# Patient Record
Sex: FEMALE | Employment: OTHER | URBAN - METROPOLITAN AREA
[De-identification: names, ages, dates, MRNs, and addresses within clinical notes are randomized per-mention and may not be internally consistent; named-entity substitution may affect disease eponyms.]

---

## 2017-09-05 LAB
EXT 24 HR UR METANEPHRINE: ABNORMAL
EXT 24 HR UR NORMETANEPHRINE: ABNORMAL
EXT 24 HR UR NORMETANEPHRINE: ABNORMAL
EXT 25 HYDROXY VIT D2: ABNORMAL
EXT 25 HYDROXY VIT D3: ABNORMAL
EXT 5 HIAA 24 HR URINE: ABNORMAL
EXT 5 HIAA BLOOD: ABNORMAL
EXT ACTH: ABNORMAL
EXT AFP: ABNORMAL
EXT ALBUMIN: 4.2 G/DL (ref 3.4–4.8)
EXT ALKALINE PHOSPHATASE: 115 U/L (ref 35–104)
EXT ALT: 13 U/L (ref 4–31)
EXT AMYLASE: ABNORMAL
EXT ANTI ISLET CELL AB: ABNORMAL
EXT ANTI PARIETAL CELL AB: ABNORMAL
EXT ANTI THYROID AB: ABNORMAL
EXT AST: 18 U/L (ref 4–31)
EXT BILIRUBIN DIRECT: ABNORMAL MG/DL
EXT BILIRUBIN TOTAL: 0.4 MG/DL (ref 0.2–1.2)
EXT BK VIRUS DNA QN PCR: ABNORMAL
EXT BUN: 18.3 MG/DL (ref 8–23)
EXT C PEPTIDE: ABNORMAL
EXT CA 125: ABNORMAL
EXT CA 19-9: ABNORMAL
EXT CA 27-29: ABNORMAL
EXT CALCITONIN: ABNORMAL
EXT CALCIUM: 9.8 MG/DL (ref 8.7–10.7)
EXT CEA: ABNORMAL
EXT CHLORIDE: 107 MEQ/L (ref 96–108)
EXT CHOLESTEROL: 171 MG/DL (ref 3–200)
EXT CHROMOGRANIN A: ABNORMAL
EXT CO2: 28 MEQ/L (ref 20–31)
EXT CREATININE UA: ABNORMAL
EXT CREATININE: 1 MG/DL (ref 0.4–1.1)
EXT CYCLOSPORONE LEVEL: ABNORMAL
EXT DOPAMINE: ABNORMAL
EXT EBV DNA BY PCR: ABNORMAL
EXT EPINEPHRINE: ABNORMAL
EXT FOLATE: ABNORMAL
EXT FREE T3: ABNORMAL
EXT FREE T4: ABNORMAL
EXT FSH: ABNORMAL
EXT GASTRIN RELEASING PEPTIDE: ABNORMAL
EXT GASTRIN RELEASING PEPTIDE: ABNORMAL
EXT GASTRIN: ABNORMAL
EXT GGT: ABNORMAL
EXT GHRELIN: ABNORMAL
EXT GLUCAGON: ABNORMAL
EXT GLUCOSE: 109 MG/DL (ref 70–100)
EXT GROWTH HORMONE: ABNORMAL
EXT HCV RNA QUANT PCR: ABNORMAL
EXT HDL: 63 MG/DL
EXT HEMATOCRIT: 38 % (ref 35–60)
EXT HEMOGLOBIN A1C: ABNORMAL
EXT HEMOGLOBIN: 12.1 GM/DL (ref 11–18)
EXT HISTAMINE 24 HR URINE: ABNORMAL
EXT HISTAMINE: ABNORMAL
EXT IGF-1: ABNORMAL
EXT IMMUNKNOW (NON-STIMULATED): ABNORMAL
EXT IMMUNKNOW (STIMULATED): ABNORMAL
EXT INR: ABNORMAL
EXT INSULIN: ABNORMAL
EXT LANREOTIDE LEVEL: ABNORMAL
EXT LDH, TOTAL: ABNORMAL
EXT LDL CHOLESTEROL: 87 MG/DL (ref 0–100)
EXT LIPASE: ABNORMAL
EXT MAGNESIUM: ABNORMAL
EXT METANEPHRINE FREE PLASMA: ABNORMAL
EXT MOTILIN: ABNORMAL
EXT NEUROKININ A CAMB: ABNORMAL
EXT NEUROKININ A ISI: ABNORMAL
EXT NEUROTENSIN: ABNORMAL
EXT NOREPINEPHRINE: ABNORMAL
EXT NORMETANEPHRINE: ABNORMAL
EXT NSE: ABNORMAL
EXT OCTREOTIDE LEVEL: ABNORMAL
EXT PANCREASTATIN CAMB: ABNORMAL
EXT PANCREASTATIN ISI: ABNORMAL
EXT PANCREATIC POLYPEPTIDE: ABNORMAL
EXT PHOSPHORUS: ABNORMAL
EXT PLATELETS: 263 K/UL (ref 150–450)
EXT POTASSIUM: 5.5 MEQ/L (ref 3.5–5.5)
EXT PROGRAF LEVEL: ABNORMAL
EXT PROLACTIN: ABNORMAL
EXT PROTEIN TOTAL: 6.6 G/DL (ref 6–8.4)
EXT PROTEIN UA: ABNORMAL
EXT PT: ABNORMAL
EXT PTH, INTACT: ABNORMAL
EXT PTT: ABNORMAL
EXT RAPAMUNE LEVEL: ABNORMAL
EXT SEROTONIN: ABNORMAL
EXT SODIUM: 144 MEQ/L (ref 133–148)
EXT SOMATOSTATIN: ABNORMAL
EXT SUBSTANCE P: ABNORMAL
EXT TRIGLYCERIDES: 104 MG/DL (ref 0–150)
EXT TRYPTASE: ABNORMAL
EXT TSH: 1270 UIU/ML (ref 0.27–4.2)
EXT URIC ACID: ABNORMAL
EXT URINE AMYLASE U/HR: ABNORMAL
EXT URINE AMYLASE U/L: ABNORMAL
EXT VASOACTIVE INTESTINAL POLYPEPTIDE: ABNORMAL
EXT VITAMIN B12: ABNORMAL
EXT VMA 24 HR URINE: ABNORMAL
EXT WBC: 5.7 K/UL (ref 4.5–10.5)
NEURON SPECIFIC ENOLASE: ABNORMAL

## 2017-09-06 LAB
EXT 24 HR UR METANEPHRINE: ABNORMAL
EXT 24 HR UR NORMETANEPHRINE: ABNORMAL
EXT 24 HR UR NORMETANEPHRINE: ABNORMAL
EXT 25 HYDROXY VIT D2: ABNORMAL
EXT 25 HYDROXY VIT D3: ABNORMAL
EXT 5 HIAA 24 HR URINE: ABNORMAL
EXT 5 HIAA BLOOD: ABNORMAL
EXT ACTH: ABNORMAL
EXT AFP: ABNORMAL
EXT ALBUMIN: ABNORMAL
EXT ALKALINE PHOSPHATASE: ABNORMAL
EXT ALT: ABNORMAL
EXT AMYLASE: ABNORMAL
EXT ANTI ISLET CELL AB: ABNORMAL
EXT ANTI PARIETAL CELL AB: ABNORMAL
EXT ANTI THYROID AB: ABNORMAL
EXT AST: ABNORMAL
EXT BILIRUBIN DIRECT: ABNORMAL MG/DL
EXT BILIRUBIN TOTAL: ABNORMAL
EXT BK VIRUS DNA QN PCR: ABNORMAL
EXT BUN: ABNORMAL
EXT C PEPTIDE: ABNORMAL
EXT CA 125: ABNORMAL
EXT CA 19-9: ABNORMAL
EXT CA 27-29: ABNORMAL
EXT CALCITONIN: ABNORMAL
EXT CALCIUM: ABNORMAL
EXT CEA: ABNORMAL
EXT CHLORIDE: ABNORMAL
EXT CHOLESTEROL: ABNORMAL
EXT CHROMOGRANIN A: 1193 NG/ML (ref 0–93)
EXT CO2: ABNORMAL
EXT CREATININE UA: ABNORMAL
EXT CREATININE: ABNORMAL MG/DL
EXT CYCLOSPORONE LEVEL: ABNORMAL
EXT DOPAMINE: ABNORMAL
EXT EBV DNA BY PCR: ABNORMAL
EXT EPINEPHRINE: ABNORMAL
EXT FOLATE: ABNORMAL
EXT FREE T3: ABNORMAL
EXT FREE T4: ABNORMAL
EXT FSH: ABNORMAL
EXT GASTRIN RELEASING PEPTIDE: ABNORMAL
EXT GASTRIN RELEASING PEPTIDE: ABNORMAL
EXT GASTRIN: ABNORMAL
EXT GGT: ABNORMAL
EXT GHRELIN: ABNORMAL
EXT GLUCAGON: ABNORMAL
EXT GLUCOSE: ABNORMAL
EXT GROWTH HORMONE: ABNORMAL
EXT HCV RNA QUANT PCR: ABNORMAL
EXT HDL: ABNORMAL
EXT HEMATOCRIT: ABNORMAL
EXT HEMOGLOBIN A1C: ABNORMAL
EXT HEMOGLOBIN: ABNORMAL
EXT HISTAMINE 24 HR URINE: ABNORMAL
EXT HISTAMINE: ABNORMAL
EXT IGF-1: ABNORMAL
EXT IMMUNKNOW (NON-STIMULATED): ABNORMAL
EXT IMMUNKNOW (STIMULATED): ABNORMAL
EXT INR: ABNORMAL
EXT INSULIN: ABNORMAL
EXT LANREOTIDE LEVEL: ABNORMAL
EXT LDH, TOTAL: ABNORMAL
EXT LDL CHOLESTEROL: ABNORMAL
EXT LIPASE: ABNORMAL
EXT MAGNESIUM: ABNORMAL
EXT METANEPHRINE FREE PLASMA: ABNORMAL
EXT MOTILIN: ABNORMAL
EXT NEUROKININ A CAMB: ABNORMAL
EXT NEUROKININ A ISI: ABNORMAL
EXT NEUROTENSIN: ABNORMAL
EXT NOREPINEPHRINE: ABNORMAL
EXT NORMETANEPHRINE: ABNORMAL
EXT NSE: ABNORMAL
EXT OCTREOTIDE LEVEL: ABNORMAL
EXT PANCREASTATIN CAMB: ABNORMAL
EXT PANCREASTATIN ISI: ABNORMAL
EXT PANCREATIC POLYPEPTIDE: ABNORMAL
EXT PHOSPHORUS: ABNORMAL
EXT PLATELETS: ABNORMAL
EXT POTASSIUM: ABNORMAL
EXT PROGRAF LEVEL: ABNORMAL
EXT PROLACTIN: ABNORMAL
EXT PROTEIN TOTAL: ABNORMAL
EXT PROTEIN UA: ABNORMAL
EXT PT: ABNORMAL
EXT PTH, INTACT: ABNORMAL
EXT PTT: ABNORMAL
EXT RAPAMUNE LEVEL: ABNORMAL
EXT SEROTONIN: 84 NG/ML (ref 56–244)
EXT SODIUM: ABNORMAL MMOL/L
EXT SOMATOSTATIN: ABNORMAL
EXT SUBSTANCE P: ABNORMAL
EXT TRIGLYCERIDES: ABNORMAL
EXT TRYPTASE: ABNORMAL
EXT TSH: ABNORMAL
EXT URIC ACID: ABNORMAL
EXT URINE AMYLASE U/HR: ABNORMAL
EXT URINE AMYLASE U/L: ABNORMAL
EXT VASOACTIVE INTESTINAL POLYPEPTIDE: ABNORMAL
EXT VITAMIN B12: ABNORMAL
EXT VMA 24 HR URINE: ABNORMAL
EXT WBC: ABNORMAL
NEURON SPECIFIC ENOLASE: ABNORMAL

## 2017-09-08 ENCOUNTER — TELEPHONE (OUTPATIENT)
Dept: NEUROLOGY | Facility: HOSPITAL | Age: 77
End: 2017-09-08

## 2017-09-08 NOTE — TELEPHONE ENCOUNTER
Returned call to the number in the snapshot and the mailbox was full.  Phoned the daughter, Kimmy Wilson also and the VM had not been set up yet.  Unable to leave messages at either number.  Will try again later.

## 2017-09-08 NOTE — TELEPHONE ENCOUNTER
----- Message from Aga Villavicencio sent at 9/6/2017 12:59 PM CDT -----  EAW/NEW - Referred by: Dr. Saldivar from [facility]      Why do you need to be seen?  Stomach Carcinoid w/ Syndrome     Which doctor are you requesting?  Balta

## 2017-09-26 ENCOUNTER — TELEPHONE (OUTPATIENT)
Dept: NEUROLOGY | Facility: HOSPITAL | Age: 77
End: 2017-09-26

## 2017-09-26 NOTE — TELEPHONE ENCOUNTER
----- Message from Kaylyn Evans sent at 9/25/2017  1:15 PM CDT -----  Contact: Patient  Patient called to see if anything had been set up yet for an appointment.  She stated she can't retrieve messages on her phone but she checks the caller ID every day to see if we called.  Per patient, call when ready to schedule or if we need more information and she will call back if she is not at home.  Patient can be reached at [phone].  Thank you  abd

## 2017-09-26 NOTE — TELEPHONE ENCOUNTER
Tried to call patient and her daughter again and unable to leave messages or VM is not set up yet.  Phoned Dr Roldan and spoke to Yanelis informing her of this and she will try to get the patient to call back and speak with me.

## 2017-09-27 ENCOUNTER — TELEPHONE (OUTPATIENT)
Dept: NEUROLOGY | Facility: HOSPITAL | Age: 77
End: 2017-09-27

## 2017-09-27 DIAGNOSIS — C7A.092 PRIMARY MALIGNANT CARCINOID TUMOR OF STOMACH: Primary | ICD-10-CM

## 2017-09-27 RX ORDER — LEVOTHYROXINE SODIUM 50 UG/1
50 TABLET ORAL DAILY
COMMUNITY

## 2017-09-27 RX ORDER — MELOXICAM 15 MG/1
15 TABLET ORAL DAILY
COMMUNITY

## 2017-09-27 RX ORDER — FUROSEMIDE 20 MG/1
20 TABLET ORAL SEE ADMIN INSTRUCTIONS
COMMUNITY

## 2017-09-27 RX ORDER — PANTOPRAZOLE SODIUM 40 MG/1
40 TABLET, DELAYED RELEASE ORAL SEE ADMIN INSTRUCTIONS
COMMUNITY

## 2017-09-27 RX ORDER — CARVEDILOL 3.12 MG/1
3.12 TABLET ORAL DAILY
COMMUNITY

## 2017-09-27 RX ORDER — POTASSIUM CHLORIDE 20 MEQ/1
20 TABLET, EXTENDED RELEASE ORAL SEE ADMIN INSTRUCTIONS
COMMUNITY

## 2017-09-27 RX ORDER — BETAMETHASONE DIPROPIONATE 0.5 MG/G
1 CREAM TOPICAL SEE ADMIN INSTRUCTIONS
COMMUNITY

## 2017-09-27 RX ORDER — TELMISARTAN 80 MG/1
80 TABLET ORAL DAILY
COMMUNITY

## 2017-09-27 NOTE — TELEPHONE ENCOUNTER
Phoned Yanelis again at Dr Fox's office and faxed the entire welcome packet to her office and she will se to it that the patient gets it.  She will also help facilitate getting the labs done correctly.

## 2017-09-27 NOTE — LETTER
September 27, 2017        Chelsea Fox MD  [address]  [place] [address]             [facility]  [place] [address]  Phone: [phone]  Fax: [phone]   Patient: Aga Askew   MR Number: [id number]   YOB: 1940   Date of Visit: 9/27/2017     Dear Dr. Fox,     We contacted   regarding setting up an appointment for an evaluation at our center.  We scheduled a CT scan, octreoscan, blood tumor markers and a pathology re read over the next couple of weeks.  We also scheduled an appointment with Dr. Jason Pedro MD on Wednesday, October 25, 2017 at 9:30 AM for recommendations.  We will forward you a copy of the clinic note after the visit.      Thank you for considering our program and for referring this patient.  If you have any questions, please do not hesitate to contact us.      Sincerely,      Mansi Ma RN            CC  No Recipients    Enclosure

## 2017-09-27 NOTE — TELEPHONE ENCOUNTER
Ordered octreoscan, CT, O scan, tumor markers and path re read per protocol.  Appointment made with MD, info sent to patient. 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

## 2017-09-27 NOTE — TELEPHONE ENCOUNTER
----- Message from Rebecca Romero sent at 9/27/2017  9:50 AM CDT -----  Contact: Patient  EAW/NEW- patient was calling for appointment. Who Called? Patient                                      Referred by:Dr Chelsea Fernández                                     Why do you need to be seen? Diagnosed with carcinoid                                     Which doctor are you requesting? Doesn't matter                                     You may contact patient back to schedule at: [phone]    Instructed patient to gather medical records, Cd's and medication list and fax or mail to us asap.

## 2017-09-27 NOTE — LETTER
September 27, 2017    Aga Askew  [address]  [place] [address]             [facility]  [address]  [address]  [place] [address]  Phone: [phone]  Fax: [phone] Dear Ms. Askew:    Thank you for your interest in our program. It was my pleasure speaking with you today about your upcoming appointment.     You have a scheduled appointment with Dr Jason Pedro MD on Wednesday, October 25, 2017 at 9:30 AM. Our office is located at :    [facility]  Medical Office [facility]  Neuroendocrine Tumor Clinic  [address], [address]  [place], [address]    You are scheduled for a consultation only with the physicians unless otherwise planned. Plan to be here for your visit for 2-3 hrs. If flight arrangements are made, plan to make the return flight after 6 pm if possible. [place] [place] from [facility].    In preparation for your appointment, we ask that you gather the information below and fax them to us ASAP. This will enable us to review all pertinent information at the time of your visit so that recommendations can be made and a plan of care developed for you.     Please return forms along with all paper records via fax asap.    Please fax  1. Insurance cards (front and back)-enlarged if possible  2. Drivers licenses  3. Current medicine list  4. Name, address & phone # of your physician for correspondence  5. Authorization for Release of Information-complete and return to clinic  6. Medical Records-send as soon as you have them together (see guidelines below)    Lab Work: If requested, the special lab markers do require special tubes that have to be ordered by the ordering facility. The lab work should be within 3 months.. The labs take 2-3 weeks to get results so please get labs drawn asap. The name and phone # of the send out lab that does the special labs tests is on the order. You can have the  labs drawn at [employer], [facility], a local hospital, or your doctors office (whichever works). If these lab tests need to be done, I will attach an Outpatient Order form. If you are doing your lab work at a facility other than [facility], call our office to notify us the date you have them drawn and the location and phone number of the lab for easy follow up.    Scans: Please mail copies of CD's of your last scans to the office asap. I would recommend sending them overnight with a tracking number in case of any problems. If you need updated scans, I will attach an Outpatient Order form.    Tissue Biopsy/Pathology: If you had a tissue biopsy or surgery, we will request to have your slides and/or tissue blocks sent to us to perform some special testing on them. Please provide us with a pathology report asa. This testing will be billed to your insurance company.     Operative or Procedure reports: All surgery or procedure reports related to your neuroendocrine tumor should be sent to us.    Insurance Company: You should contact your insurance company to inquire about your insurance coverage and benefits. Ask about co-payments and deductibles when seeing a specialist. Ask if this visit will be in Network or Out of Network. We may be able to work with you if this is out of network for you.    Lodging: Attached is lodging information from the [facility] and a list of local hotels. The [facility] is run by the [employer] and is free of charge. If [facility], you must call my office and talk to [facility]. You will need to complete the application and send it to my office for a physician signature. We will forward it to the [facility] and they will check availability. If you wish to stay at the [facility], apply EARLY, they fill up quickly. You may contact the [facility] a week later to confirm your reservation and ask any questions regarding the facility. You  May only stay at the [facility] 24 hrs prior to  your appointment and up to 24 hrs after your appointment. (THIS CAN ONLY BE USED IF YOU LIVE MORE THAN 40 MILES FROM OUR FACILITY).    Call me if you have any questions, email is not the best way to communicate with our office.    We are looking forward to meeting and taking care of you. If you have any questions or concerns, please don't hesitate to call.     Sincerely,        Marla Nice, RN, BSN  Nurse Manager, Neuroendocrine Tumor Program

## 2017-09-29 ENCOUNTER — TELEPHONE (OUTPATIENT)
Dept: NEUROLOGY | Facility: HOSPITAL | Age: 77
End: 2017-09-29

## 2017-09-29 NOTE — TELEPHONE ENCOUNTER
----- Message from Rebecca Romero sent at 9/29/2017 12:28 PM CDT -----  Contact: Patient  EAW- Patient called wanting to speak to Yue regarding her lab orders. Patient stated that her kidney levels are low. Patient needs clarity on which labs she needs to complete. Call back [phone]

## 2017-09-29 NOTE — TELEPHONE ENCOUNTER
Called pt, JABARI full, cant leave msg.  Called daughter.  Left message for patient to return call on her voice mail.

## 2017-10-02 ENCOUNTER — TELEPHONE (OUTPATIENT)
Dept: NEUROLOGY | Facility: HOSPITAL | Age: 77
End: 2017-10-02

## 2017-10-02 NOTE — TELEPHONE ENCOUNTER
----- Message from Aga Villavicencio sent at 9/28/2017  4:50 PM CDT -----  Regarding: RE: [facility]  I called her and we filled it out and it was submitted  ----- Message -----  From: Mansi Ma RN  Sent: 9/27/2017   2:40 PM  To: Mansi Ma RN, Aga Maye  Subject: Trinh paris                                       Jett Pittman,  Can you please fill out the [employer] application for this patient to be coming in on Sunday, October 22 and leaving on Wednesday, October 25, 2017.  She does not have email and the mail takes so long.  Thanks,  Yue

## 2017-10-03 ENCOUNTER — TELEPHONE (OUTPATIENT)
Dept: NEUROLOGY | Facility: HOSPITAL | Age: 77
End: 2017-10-03

## 2017-10-03 ENCOUNTER — TELEPHONE (OUTPATIENT)
Dept: GASTROENTEROLOGY | Facility: CLINIC | Age: 77
End: 2017-10-03

## 2017-10-03 NOTE — TELEPHONE ENCOUNTER
----- Message from Heather Bean LPN sent at 10/3/2017 11:30 AM CDT -----  Regarding: RE: upper eus  I was able to leave my number with pts daughter, she will give it to her mother today to call me and arrange scheduling    Thank you  Heather  ----- Message -----  From: Mansi Ma RN  Sent: 9/27/2017   2:42 PM  To: Mansi Ma RN, Heather Bean LPN  Subject: upper eus                                        Heather,  Can you please get this patient set up to have an upper eus for stomach carcinoid surveillance with any MD on Monday October 23 and/or October 24 and you will need to work around the scan schedule please.  She is coming from [place], [place].  Please let me know when it is scheduled.  Thanks,  Yue  She is very difficult to get in touch with and might have to call you back, her phone service is terrible!!!

## 2017-10-03 NOTE — TELEPHONE ENCOUNTER
----- Message from Mansi Ma RN sent at 9/27/2017  2:42 PM CDT -----  Regarding: upper eus  Heather,  Can you please get this patient set up to have an upper eus for stomach carcinoid surveillance with any MD on Monday October 23 and/or October 24 and you will need to work around the scan schedule please.  She is coming from [place], [place].  Please let me know when it is scheduled.  Thanks,  Yue  She is very difficult to get in touch with and might have to call you back, her phone service is terrible!!!

## 2017-10-04 ENCOUNTER — TELEPHONE (OUTPATIENT)
Dept: NEUROLOGY | Facility: HOSPITAL | Age: 77
End: 2017-10-04

## 2017-10-04 LAB
EXT 24 HR UR METANEPHRINE: ABNORMAL
EXT 24 HR UR NORMETANEPHRINE: ABNORMAL
EXT 24 HR UR NORMETANEPHRINE: ABNORMAL
EXT 25 HYDROXY VIT D2: ABNORMAL
EXT 25 HYDROXY VIT D3: ABNORMAL
EXT 5 HIAA 24 HR URINE: ABNORMAL
EXT 5 HIAA BLOOD: 8.9 NG/ML (ref 0–22)
EXT ACTH: ABNORMAL
EXT AFP: ABNORMAL
EXT ALBUMIN: 3.7 G/DL (ref 3.5–4.8)
EXT ALKALINE PHOSPHATASE: 112 IU/L (ref 39–117)
EXT ALT: 12 IU/L (ref 0–32)
EXT AMYLASE: ABNORMAL
EXT ANTI ISLET CELL AB: ABNORMAL
EXT ANTI PARIETAL CELL AB: 40.3 (ref 0–20)
EXT ANTI THYROID AB: >600 IU/ML (ref 0–34)
EXT AST: 17 IU/L (ref 0–40)
EXT BILIRUBIN DIRECT: ABNORMAL MG/DL
EXT BILIRUBIN TOTAL: 0.6 MG/DL (ref 0–1.2)
EXT BK VIRUS DNA QN PCR: ABNORMAL
EXT BUN: 16 MG/DL (ref 8–27)
EXT C PEPTIDE: ABNORMAL
EXT CA 125: ABNORMAL
EXT CA 19-9: ABNORMAL
EXT CA 27-29: ABNORMAL
EXT CALCITONIN: ABNORMAL
EXT CALCIUM: 9.1 MG/DL (ref 8.7–10.3)
EXT CEA: ABNORMAL
EXT CHLORIDE: 103 MMOL/L (ref 96–106)
EXT CHOLESTEROL: ABNORMAL
EXT CHROMOGRANIN A: 18 NMOL/L (ref 0–5)
EXT CO2: 21 MMOL/L (ref 18–29)
EXT CREATININE UA: ABNORMAL
EXT CREATININE: 1.04 MG/DL (ref 0.57–1)
EXT CYCLOSPORONE LEVEL: ABNORMAL
EXT DOPAMINE: ABNORMAL
EXT EBV DNA BY PCR: ABNORMAL
EXT EPINEPHRINE: ABNORMAL
EXT FOLATE: 17.1 NG/ML
EXT FREE T3: ABNORMAL
EXT FREE T4: ABNORMAL
EXT FSH: ABNORMAL
EXT GASTRIN RELEASING PEPTIDE: ABNORMAL
EXT GASTRIN RELEASING PEPTIDE: ABNORMAL
EXT GASTRIN: 705 PG/ML (ref 0–115)
EXT GGT: ABNORMAL
EXT GHRELIN: ABNORMAL
EXT GLUCAGON: ABNORMAL
EXT GLUCOSE: 134 MG/DL (ref 65–99)
EXT GROWTH HORMONE: ABNORMAL
EXT HCV RNA QUANT PCR: ABNORMAL
EXT HDL: ABNORMAL
EXT HEMATOCRIT: 34.6 % (ref 34–46.6)
EXT HEMOGLOBIN A1C: ABNORMAL
EXT HEMOGLOBIN: 11.2 G/DL (ref 11.1–15.9)
EXT HISTAMINE 24 HR URINE: ABNORMAL
EXT HISTAMINE: ABNORMAL
EXT IGF-1: ABNORMAL
EXT IMMUNKNOW (NON-STIMULATED): ABNORMAL
EXT IMMUNKNOW (STIMULATED): ABNORMAL
EXT INR: ABNORMAL
EXT INSULIN: ABNORMAL
EXT LANREOTIDE LEVEL: ABNORMAL
EXT LDH, TOTAL: ABNORMAL
EXT LDL CHOLESTEROL: ABNORMAL
EXT LIPASE: ABNORMAL
EXT MAGNESIUM: ABNORMAL
EXT METANEPHRINE FREE PLASMA: ABNORMAL
EXT MOTILIN: ABNORMAL
EXT NEUROKININ A CAMB: ABNORMAL
EXT NEUROKININ A ISI: 12 PG/ML (ref 0–40)
EXT NEUROTENSIN: ABNORMAL
EXT NOREPINEPHRINE: ABNORMAL
EXT NORMETANEPHRINE: ABNORMAL
EXT NSE: ABNORMAL
EXT OCTREOTIDE LEVEL: ABNORMAL
EXT PANCREASTATIN CAMB: ABNORMAL
EXT PANCREASTATIN ISI: 113 PG/ML (ref 10–135)
EXT PANCREATIC POLYPEPTIDE: ABNORMAL
EXT PHOSPHORUS: ABNORMAL
EXT PLATELETS: 232 X10E3/UL (ref 150–379)
EXT POTASSIUM: 4.3 MMOL/L (ref 3.5–5.2)
EXT PROGRAF LEVEL: ABNORMAL
EXT PROLACTIN: ABNORMAL
EXT PROTEIN TOTAL: 6.3 G/DL (ref 6–8.5)
EXT PROTEIN UA: ABNORMAL
EXT PT: ABNORMAL
EXT PTH, INTACT: ABNORMAL
EXT PTT: ABNORMAL
EXT RAPAMUNE LEVEL: ABNORMAL
EXT SEROTONIN: 33 NG/ML (ref 0–420)
EXT SODIUM: 140 MMOL/L (ref 134–144)
EXT SOMATOSTATIN: ABNORMAL
EXT SUBSTANCE P: ABNORMAL
EXT TRIGLYCERIDES: ABNORMAL
EXT TRYPTASE: ABNORMAL
EXT TSH: ABNORMAL
EXT URIC ACID: ABNORMAL
EXT URINE AMYLASE U/HR: ABNORMAL
EXT URINE AMYLASE U/L: ABNORMAL
EXT VASOACTIVE INTESTINAL POLYPEPTIDE: ABNORMAL
EXT VITAMIN B12: 188 PG/ML (ref 211–946)
EXT VMA 24 HR URINE: ABNORMAL
EXT WBC: 3.9 X10E3/UL (ref 3.4–10.8)
NEURON SPECIFIC ENOLASE: ABNORMAL

## 2017-10-04 NOTE — TELEPHONE ENCOUNTER
----- Message from Heather Bean LPN sent at 10/3/2017 11:30 AM CDT -----  Regarding: RE: upper eus  I was able to leave my number with pts daughter, she will give it to her mother today to call me and arrange scheduling    Thank you  Heather  ----- Message -----  From: Mansi Ma RN  Sent: 9/27/2017   2:42 PM  To: Mansi Ma RN, Heather Bean LPN  Subject: upper eus                                        Heather,  Can you please get this patient set up to have an upper eus for stomach carcinoid surveillance with any MD on Monday October 23 and/or October 24 and you will need to work around the scan schedule please.  She is coming from [place], [place].  Please let me know when it is scheduled.  Thanks,  Yue  She is very difficult to get in touch with and might have to call you back, her phone service is terrible!!!

## 2017-10-04 NOTE — TELEPHONE ENCOUNTER
----- Message from Genna Campoverde sent at 10/4/2017 12:36 PM CDT -----  Contact: patient  EAW/NEW- Patient called to inform the office that she did her blood work at [facility] in [place] on [address]. Patient states her phone is not working so will not be able to be contacted at this time.

## 2017-10-10 ENCOUNTER — TELEPHONE (OUTPATIENT)
Dept: GASTROENTEROLOGY | Facility: CLINIC | Age: 77
End: 2017-10-10

## 2017-10-10 DIAGNOSIS — D3A.092 CARCINOID TUMOR OF STOMACH: Primary | ICD-10-CM

## 2017-10-10 NOTE — TELEPHONE ENCOUNTER
----- Message from Kayleigh Mariana sent at 10/3/2017 12:15 PM CDT -----  Contact: self, [phone]  Patient called in returning your call. Please advise.

## 2017-10-10 NOTE — TELEPHONE ENCOUNTER
Spoke with pt, procedure date confirmed and instructions reviewed verbally as well as mailed. Pt verbalized understanding

## 2017-10-11 ENCOUNTER — TELEPHONE (OUTPATIENT)
Dept: NEUROLOGY | Facility: HOSPITAL | Age: 77
End: 2017-10-11

## 2017-10-11 NOTE — TELEPHONE ENCOUNTER
Phoned daughter's number and spoke to her about her mother's appointment.  Informed her that it looks like we have everything that we need for her appt to be comprehensive, tried calling the patient's number and could not leave a message.   The daughter would relay the message that we have all the necessary information for her to have a comprehensive appt.

## 2017-10-11 NOTE — TELEPHONE ENCOUNTER
----- Message from Heather Bean LPN sent at 10/10/2017  3:47 PM CDT -----  Contact: Heather COLON/Jamey Novoa there,    I as able to confirm her procedure for Oct 24th.     She requested to speak with you to be sure she is finalized for all testing    Thank you  Heather

## 2017-10-16 ENCOUNTER — TELEPHONE (OUTPATIENT)
Dept: NEUROLOGY | Facility: HOSPITAL | Age: 77
End: 2017-10-16

## 2017-10-16 NOTE — TELEPHONE ENCOUNTER
Returned call to patient and could not leave a message.  Will try to call daughter. Phoned daughter and started telling her that she did not need to be off the mobic but the phone went dead. Phoned back and finished the message and she will get it to her mother.

## 2017-10-16 NOTE — TELEPHONE ENCOUNTER
----- Message from Rebecca Romero sent at 10/16/2017  1:16 PM CDT -----  Contact: patient  EAW- patient called to see if she needs to be off maloxicam before her scans. Patients call back number [phone]

## 2017-10-23 ENCOUNTER — HOSPITAL ENCOUNTER (OUTPATIENT)
Dept: RADIOLOGY | Facility: HOSPITAL | Age: 77
Discharge: HOME | End: 2017-10-23
Attending: SURGERY
Payer: COMMERCIAL

## 2017-10-23 DIAGNOSIS — C7A.092 PRIMARY MALIGNANT CARCINOID TUMOR OF STOMACH: ICD-10-CM

## 2017-10-23 PROCEDURE — 78803 RP LOCLZJ TUM SPECT 1 AREA: CPT | Mod: TC

## 2017-10-23 PROCEDURE — 74177 CT ABD & PELVIS W/CONTRAST: CPT | Mod: TC

## 2017-10-23 PROCEDURE — 78804 RP LOCLZJ TUM WHBDY 2+D IMG: CPT | Mod: 26,,, | Performed by: RADIOLOGY

## 2017-10-23 PROCEDURE — 25500020 PHARM REV CODE 255: Performed by: SURGERY

## 2017-10-23 PROCEDURE — 78803 RP LOCLZJ TUM SPECT 1 AREA: CPT | Mod: 26,,, | Performed by: RADIOLOGY

## 2017-10-23 PROCEDURE — 74150 CT ABDOMEN W/O CONTRAST: CPT | Mod: 26,,, | Performed by: RADIOLOGY

## 2017-10-23 PROCEDURE — 74177 CT ABD & PELVIS W/CONTRAST: CPT | Mod: 26,,, | Performed by: RADIOLOGY

## 2017-10-23 RX ADMIN — IOHEXOL 100 ML: 350 INJECTION, SOLUTION INTRAVENOUS at 09:10

## 2017-10-23 RX ADMIN — IOHEXOL 30 ML: 350 INJECTION, SOLUTION INTRAVENOUS at 07:10

## 2017-10-24 ENCOUNTER — HOSPITAL ENCOUNTER (OUTPATIENT)
Dept: RADIOLOGY | Facility: HOSPITAL | Age: 77
Discharge: HOME | End: 2017-10-24
Attending: SURGERY
Payer: COMMERCIAL

## 2017-10-24 ENCOUNTER — SURGERY (OUTPATIENT)
Age: 77
End: 2017-10-24

## 2017-10-24 ENCOUNTER — HOSPITAL ENCOUNTER (OUTPATIENT)
Dept: RADIOLOGY | Facility: HOSPITAL | Age: 77
Discharge: HOME | End: 2017-10-24
Attending: SURGERY | Admitting: INTERNAL MEDICINE
Payer: COMMERCIAL

## 2017-10-24 ENCOUNTER — ANESTHESIA (OUTPATIENT)
Dept: ENDOSCOPY | Facility: HOSPITAL | Age: 77
End: 2017-10-24
Payer: COMMERCIAL

## 2017-10-24 ENCOUNTER — ANESTHESIA EVENT (OUTPATIENT)
Dept: ENDOSCOPY | Facility: HOSPITAL | Age: 77
End: 2017-10-24
Payer: COMMERCIAL

## 2017-10-24 ENCOUNTER — HOSPITAL ENCOUNTER (OUTPATIENT)
Facility: HOSPITAL | Age: 77
Discharge: HOME | End: 2017-10-24
Attending: INTERNAL MEDICINE | Admitting: INTERNAL MEDICINE
Payer: COMMERCIAL

## 2017-10-24 VITALS
SYSTOLIC BLOOD PRESSURE: 174 MMHG | BODY MASS INDEX: 35.49 KG/M2 | HEART RATE: 59 BPM | OXYGEN SATURATION: 98 % | RESPIRATION RATE: 16 BRPM | DIASTOLIC BLOOD PRESSURE: 66 MMHG | TEMPERATURE: 98 F | WEIGHT: 213 LBS | HEIGHT: 65 IN

## 2017-10-24 DIAGNOSIS — D3A.092 CARCINOID TUMOR OF STOMACH: Primary | ICD-10-CM

## 2017-10-24 DIAGNOSIS — C7A.092 PRIMARY MALIGNANT CARCINOID TUMOR OF STOMACH: ICD-10-CM

## 2017-10-24 PROCEDURE — 63600175 PHARM REV CODE 636 W HCPCS: Performed by: NURSE ANESTHETIST, CERTIFIED REGISTERED

## 2017-10-24 PROCEDURE — 78803 RP LOCLZJ TUM SPECT 1 AREA: CPT | Mod: 26,,, | Performed by: RADIOLOGY

## 2017-10-24 PROCEDURE — 43239 EGD BIOPSY SINGLE/MULTIPLE: CPT | Performed by: INTERNAL MEDICINE

## 2017-10-24 PROCEDURE — 43259 EGD US EXAM DUODENUM/JEJUNUM: CPT | Mod: ,,, | Performed by: INTERNAL MEDICINE

## 2017-10-24 PROCEDURE — 88360 TUMOR IMMUNOHISTOCHEM/MANUAL: CPT | Performed by: PATHOLOGY

## 2017-10-24 PROCEDURE — 88305 TISSUE EXAM BY PATHOLOGIST: CPT | Performed by: PATHOLOGY

## 2017-10-24 PROCEDURE — 43239 EGD BIOPSY SINGLE/MULTIPLE: CPT | Mod: 59,,, | Performed by: INTERNAL MEDICINE

## 2017-10-24 PROCEDURE — 43231 ESOPHAGOSCOP ULTRASOUND EXAM: CPT | Performed by: INTERNAL MEDICINE

## 2017-10-24 PROCEDURE — 37000008 HC ANESTHESIA 1ST 15 MINUTES: Performed by: INTERNAL MEDICINE

## 2017-10-24 PROCEDURE — 27201012 HC FORCEPS, HOT/COLD, DISP: Performed by: INTERNAL MEDICINE

## 2017-10-24 PROCEDURE — 63600175 PHARM REV CODE 636 W HCPCS: Performed by: INTERNAL MEDICINE

## 2017-10-24 PROCEDURE — 74150 CT ABDOMEN W/O CONTRAST: CPT | Mod: TC

## 2017-10-24 PROCEDURE — A9572 INDIUM IN-111 PENTETREOTIDE: HCPCS

## 2017-10-24 PROCEDURE — 78803 RP LOCLZJ TUM SPECT 1 AREA: CPT | Mod: TC

## 2017-10-24 PROCEDURE — 37000009 HC ANESTHESIA EA ADD 15 MINS: Performed by: INTERNAL MEDICINE

## 2017-10-24 PROCEDURE — 25000003 PHARM REV CODE 250: Performed by: INTERNAL MEDICINE

## 2017-10-24 PROCEDURE — 27200997: Performed by: INTERNAL MEDICINE

## 2017-10-24 PROCEDURE — 88305 TISSUE EXAM BY PATHOLOGIST: CPT | Mod: 26,,, | Performed by: PATHOLOGY

## 2017-10-24 PROCEDURE — 88360 TUMOR IMMUNOHISTOCHEM/MANUAL: CPT | Mod: 26,,, | Performed by: PATHOLOGY

## 2017-10-24 PROCEDURE — 88342 IMHCHEM/IMCYTCHM 1ST ANTB: CPT | Mod: 26,59,, | Performed by: PATHOLOGY

## 2017-10-24 RX ORDER — SODIUM CHLORIDE 9 MG/ML
INJECTION, SOLUTION INTRAVENOUS CONTINUOUS
Status: DISCONTINUED | OUTPATIENT
Start: 2017-10-24 | End: 2017-10-24 | Stop reason: HOSPADM

## 2017-10-24 RX ORDER — ONDANSETRON HCL IN 0.9 % NACL 8 MG/50 ML
8 INTRAVENOUS SOLUTION, PIGGYBACK (ML) INTRAVENOUS ONCE AS NEEDED
Status: DISCONTINUED | OUTPATIENT
Start: 2017-10-24 | End: 2017-10-24

## 2017-10-24 RX ORDER — FENTANYL CITRATE 50 UG/ML
INJECTION, SOLUTION INTRAMUSCULAR; INTRAVENOUS
Status: DISCONTINUED | OUTPATIENT
Start: 2017-10-24 | End: 2017-10-24

## 2017-10-24 RX ORDER — ONDANSETRON 2 MG/ML
8 INJECTION INTRAMUSCULAR; INTRAVENOUS ONCE AS NEEDED
Status: COMPLETED | OUTPATIENT
Start: 2017-10-24 | End: 2017-10-24

## 2017-10-24 RX ORDER — PROPOFOL 10 MG/ML
VIAL (ML) INTRAVENOUS CONTINUOUS PRN
Status: DISCONTINUED | OUTPATIENT
Start: 2017-10-24 | End: 2017-10-24

## 2017-10-24 RX ORDER — PROPOFOL 10 MG/ML
VIAL (ML) INTRAVENOUS
Status: DISCONTINUED | OUTPATIENT
Start: 2017-10-24 | End: 2017-10-24

## 2017-10-24 RX ORDER — LIDOCAINE HCL/PF 100 MG/5ML
SYRINGE (ML) INTRAVENOUS
Status: DISCONTINUED | OUTPATIENT
Start: 2017-10-24 | End: 2017-10-24

## 2017-10-24 RX ADMIN — ONDANSETRON 8 MG: 2 INJECTION, SOLUTION INTRAMUSCULAR; INTRAVENOUS at 10:10

## 2017-10-24 RX ADMIN — FENTANYL CITRATE 50 MCG: 50 INJECTION, SOLUTION INTRAMUSCULAR; INTRAVENOUS at 11:10

## 2017-10-24 RX ADMIN — PROPOFOL 150 MCG/KG/MIN: 10 INJECTION, EMULSION INTRAVENOUS at 11:10

## 2017-10-24 RX ADMIN — OCTREOTIDE ACETATE: 500 INJECTION, SOLUTION INTRAVENOUS; SUBCUTANEOUS at 10:10

## 2017-10-24 RX ADMIN — PROPOFOL 50 MG: 10 INJECTION, EMULSION INTRAVENOUS at 11:10

## 2017-10-24 RX ADMIN — LIDOCAINE HYDROCHLORIDE 75 MG: 20 INJECTION, SOLUTION INTRAVENOUS at 11:10

## 2017-10-24 RX ADMIN — SODIUM CHLORIDE: 0.9 INJECTION, SOLUTION INTRAVENOUS at 10:10

## 2017-10-24 NOTE — DISCHARGE INSTRUCTIONS
Post Upper EUS Instructions:     Aga Askew  10/24/2017  Edward Mendoza MD    1. Do Not eat or drink anything for 1 hour. Try sips of water first. If tolerated, resume your regular diet or one recommended by your physician.  2. Do not drive, or operate machinery, make critical decisions, or do activities that require coordination or balance for 24 hours.  3. You may experience a sore throat for 24 to 48 hours. You may use throat lozenges or gargle with warm salt water to relieve the discomfort.  4. Because air was put into your stomach during the procedure, you may experience some belching.   Go directly to the emergency room if you notice any of the following:                              Chills and/or fever over 101                Persistent vomiting or vomiting with blood                Severe abdominal pain, other than gas cramps                Severe chest pain                Black, tarry stools    If you have any questions or problems, please call your Physician:    Edward Mendoza MD     Lab Results: [phone]    If a complication or emergency situation arises and you are unable to reach your Physician - GO TO THE EMERGENCY ROOM.

## 2017-10-24 NOTE — DISCHARGE SUMMARY
Discharge Summary/Instructions for after an Upper EUS    Aga Askew    Tuesday, October 24, 2017  Edward Mendoza MD    1.  Do Not eat or drink anything for 1 hour.  Try sips of water first.  If tolerated, resume your regular diet or one recommended by your physician.  2.  Do not drive, operate machinery, make critical decisions, or do activities that require coordination or balance for 24 hours.  3.  You may experience a sore throat for 24 to 48 hours.  You may use throat lozenges or gargle with warm salt water to relieve the discomfort.  4.  Because air was put into your stomach during the procedure, you may experience some belching.  5.  Go directly to the emergency room if you notice any of the following:     Chills and/or fever over 101   Persistent vomiting or vomiting with blood   Severe abdominal pain, other than gas cramps   Severe chest pain   Black, tarry stools    Your doctor recommends these additional instructions:    None    You are being discharged to home.   We are waiting for pathology results.   Your physician has recommended a repeat upper endoscopic ultrasound in six months for surveillance based on pathology results.    If you have any questions on the above instructions, call the GI Lab and ask for an endoscopy nurse.    If you have any problems, please call your physician.  EMERGENCY PHONE NUMBER: [phone]  LAB RESULTS: [phone]

## 2017-10-24 NOTE — TRANSFER OF CARE
"Anesthesia Transfer of Care Note    Patient: Aga Askew    Procedure(s) Performed: Procedure(s) (LRB):  ULTRASOUND-ENDOSCOPIC-UPPER (N/A)    Patient location: GI    Anesthesia Type: MAC    Transport from OR: Transported from OR on room air with adequate spontaneous ventilation    Post pain: adequate analgesia    Post assessment: no apparent anesthetic complications    Post vital signs: stable    Level of consciousness: awake    Nausea/Vomiting: no nausea/vomiting    Complications: none    Transfer of care protocol was followed      Last vitals:   Visit Vitals  BP (!) 115/57 (BP Location: Left arm, Patient Position: Lying)   Pulse 70   Temp 36.5 °C (97.7 °F) (Oral)   Resp 16   Ht 5' 5" (1.651 m)   Wt 96.6 kg (213 lb)   SpO2 98%   Breastfeeding? No   BMI 35.45 kg/m²     "

## 2017-10-24 NOTE — PLAN OF CARE
Past Medical History:   Diagnosis Date    HTN (hypertension)     Hypothyroidism     Malignant carcinoid tumor of stomach    Cataract    Accompanied by daughter, ok for MD to speak to her re: results of exam.

## 2017-10-24 NOTE — ANESTHESIA PREPROCEDURE EVALUATION
10/24/2017  Aga Askew is a 77 y.o., female with Carciniod tumor of the stomach.     Anesthesia Evaluation      I have reviewed the Medications.     Review of Systems  Anesthesia Hx:  No problems with previous Anesthesia Denies Hx of Anesthetic complications History of prior surgery of interest to airway management or planning: Previous anesthesia: General Denies Family Hx of Anesthesia complications.   Denies Personal Hx of Anesthesia complications.   Social:  Non-Smoker, No Alcohol Use    Hematology/Oncology:  Hematology Normal   Oncology Normal     EENT/Dental:EENT/Dental Normal   Cardiovascular:   Exercise tolerance: good Hypertension    Pulmonary:  Pulmonary Normal    Renal/:  Renal/ Normal     Hepatic/GI:   Hiatal Hernia,  Hepatic/GI Symptoms: nausea.  Esophageal / Stomach Disorders Controlled by s/p surgical Fundoplication.    Musculoskeletal:  Musculoskeletal Normal    Neurological:  Neurology Normal    Endocrine:   Hypothyroidism    Dermatological:  Skin Normal    Psych:  Psychiatric Normal           Physical Exam  General:  Well nourished    Airway/Jaw/Neck:  Airway Findings: Mouth Opening: Normal Tongue: Normal  General Airway Assessment: Adult  Mallampati: II      Dental:  Dental Findings: In tact    Chest/Lungs:  Chest/Lungs Findings: Clear to auscultation, Normal Respiratory Rate     Heart/Vascular:  Heart Findings: Rate: Normal  Rhythm: Regular Rhythm        Mental Status:  Mental Status Findings:  Cooperative, Alert and Oriented         Anesthesia Plan  Type of Anesthesia, risks & benefits discussed:  Anesthesia Type:  MAC, general  Patient's Preference: MAC  Intra-op Monitoring Plan:   Intra-op Monitoring Plan Comments:   Post Op Pain Control Plan:   Post Op Pain Control Plan Comments:   Induction:   IV  Beta Blocker:         Informed Consent: Patient understands risks and agrees with  Anesthesia plan.  Questions answered. Anesthesia consent signed with patient.  ASA Score: 2     Day of Surgery Review of History & Physical:            Ready For Surgery From Anesthesia Perspective.

## 2017-10-24 NOTE — ANESTHESIA POSTPROCEDURE EVALUATION
"Anesthesia Post Evaluation    Patient: Aga Askew    Procedure(s) Performed: Procedure(s) (LRB):  ULTRASOUND-ENDOSCOPIC-UPPER (N/A)    Final Anesthesia Type: MAC  Patient location during evaluation: GI PACU  Patient participation: Yes- Able to Participate  Level of consciousness: awake and alert  Post-procedure vital signs: reviewed and stable  Pain management: adequate  Airway patency: patent  PONV status at discharge: No PONV  Anesthetic complications: no      Cardiovascular status: blood pressure returned to baseline and hemodynamically stable  Respiratory status: unassisted, spontaneous ventilation and room air  Hydration status: euvolemic  Follow-up not needed.        Visit Vitals  BP (!) 115/57 (BP Location: Left arm, Patient Position: Lying)   Pulse 70   Temp 36.5 °C (97.7 °F) (Oral)   Resp 16   Ht 5' 5" (1.651 m)   Wt 96.6 kg (213 lb)   SpO2 98%   Breastfeeding? No   BMI 35.45 kg/m²       Pain/Eloy Score: No Data Recorded      "

## 2017-10-24 NOTE — H&P
History & Physical - Short Stay  Gastroenterology      SUBJECTIVE:     Procedure: EUS    Chief Complaint/Indication for Procedure: Gastric carcinoid    History of Present Illness:  Patient is a 77 y.o. female presents with gastric fundus carcinoid here for evaluation. Stated intermittent episode of diarrhea, denied rash.    PTA Medications   Medication Sig    betamethasone dipropionate (DIPROLENE) 0.05 % cream Apply 1 each topically As instructed.    carvedilol (COREG) 3.125 MG tablet Take 3.125 mg by mouth once daily.    furosemide (LASIX) 20 MG tablet Take 20 mg by mouth As instructed.    levothyroxine (SYNTHROID) 50 MCG tablet Take 50 mcg by mouth once daily.    meloxicam (MOBIC) 15 MG tablet Take 15 mg by mouth once daily.    potassium chloride SA (K-DUR,KLOR-CON) 20 MEQ tablet Take 20 mEq by mouth As instructed.    telmisartan (MICARDIS) 80 MG Tab Take 80 mg by mouth once daily.    pantoprazole (PROTONIX) 40 MG tablet Take 40 mg by mouth As instructed.       Review of patient's allergies indicates:   Allergen Reactions    Codeine Shortness Of Breath        Past Medical History:   Diagnosis Date    HTN (hypertension)     Hypothyroidism     Malignant carcinoid tumor of stomach      Past Surgical History:   Procedure Laterality Date    APPENDECTOMY      CHOLECYSTECTOMY      COLON SURGERY      fistula repair    COLONOSCOPY      ESOPHAGEAL DILATION      2-3 times    FOOT SURGERY Right     fused    HIATAL HERNIA REPAIR      HYSTERECTOMY  02/2008     History reviewed. No pertinent family history.  Social History   Substance Use Topics    Smoking status: Never Smoker    Smokeless tobacco: Never Used    Alcohol use No       Review of Systems:  Constitutional: no fever or chills  Respiratory: no cough or shortness of breath  Cardiovascular: no chest pain or palpitations  Gastrointestinal: positive for diarrhea    OBJECTIVE:     Vital Signs (Most Recent)  Temp: 97.7 °F (36.5 °C) (10/24/17  0957)  Pulse: 61 (10/24/17 0957)  Resp: 20 (10/24/17 0957)  BP: (!) 198/89 (10/24/17 1002)  SpO2: 100 % (10/24/17 0957)    Physical Exam:  General: well developed, well nourished  Lungs:  clear to auscultation bilaterally and normal respiratory effort  Heart: regular rate, S1, S2 normal  Abdomen: soft, non-tender non-distented; bowel sounds normal; no masses,  no organomegaly    Laboratory  CBC: No results for input(s): WBC, RBC, HGB, HCT, PLT, MCV, MCH, MCHC in the last 168 hours.  CMP: No results for input(s): GLU, CALCIUM, ALBUMIN, PROT, NA, K, CO2, CL, BUN, CREATININE, ALKPHOS, ALT, AST, BILITOT in the last 168 hours.  Coagulation: No results for input(s): LABPROT, INR, APTT in the last 168 hours.      Diagnostic Results:       ASSESSMENT/PLAN:     Gastric carcinoid    Plan: EUS    Anesthesia Plan: MAC    ASA Grade: ASA 3 - Patient with moderate systemic disease with functional limitations       The impression and plan was discussed in detail with the patient and family. All questions have been answered and the patient voices understanding of our plan at this point. The risk of the procedure was discussed in detail which includes but not limited to bleeding, infection, perforation in some cases requiring surgery with its spectrum of complications.

## 2017-10-25 ENCOUNTER — LAB VISIT (OUTPATIENT)
Dept: LAB | Facility: HOSPITAL | Age: 77
End: 2017-10-25
Attending: SURGERY
Payer: COMMERCIAL

## 2017-10-25 ENCOUNTER — TELEPHONE (OUTPATIENT)
Dept: GASTROENTEROLOGY | Facility: CLINIC | Age: 77
End: 2017-10-25

## 2017-10-25 ENCOUNTER — OFFICE VISIT (OUTPATIENT)
Dept: NEUROLOGY | Facility: HOSPITAL | Age: 77
End: 2017-10-25
Attending: SURGERY
Payer: COMMERCIAL

## 2017-10-25 VITALS
SYSTOLIC BLOOD PRESSURE: 179 MMHG | HEIGHT: 64 IN | TEMPERATURE: 97 F | WEIGHT: 214.06 LBS | BODY MASS INDEX: 36.55 KG/M2 | DIASTOLIC BLOOD PRESSURE: 82 MMHG | HEART RATE: 68 BPM

## 2017-10-25 DIAGNOSIS — D3A.092 CARCINOID TUMOR OF STOMACH: Primary | ICD-10-CM

## 2017-10-25 DIAGNOSIS — C7A.092 PRIMARY MALIGNANT CARCINOID TUMOR OF STOMACH: ICD-10-CM

## 2017-10-25 PROCEDURE — 88323 CONSLTJ&REPRT MATRL PREP SLD: CPT

## 2017-10-25 PROCEDURE — 88360 TUMOR IMMUNOHISTOCHEM/MANUAL: CPT | Mod: 26,59,,

## 2017-10-25 PROCEDURE — 88399 UNLISTED SURGICAL PATH PX: CPT

## 2017-10-25 PROCEDURE — 88323 CONSLTJ&REPRT MATRL PREP SLD: CPT | Mod: 26,,,

## 2017-10-25 PROCEDURE — 88360 TUMOR IMMUNOHISTOCHEM/MANUAL: CPT

## 2017-10-25 PROCEDURE — 99215 OFFICE O/P EST HI 40 MIN: CPT | Mod: 25 | Performed by: SURGERY

## 2017-10-25 RX ORDER — ACETAMINOPHEN 500 MG
500 TABLET ORAL EVERY 6 HOURS PRN
COMMUNITY

## 2017-10-25 RX ORDER — LANOLIN ALCOHOL/MO/W.PET/CERES
CREAM (GRAM) TOPICAL
COMMUNITY
Start: 2016-02-16

## 2017-10-25 NOTE — LETTER
October 25, 2017        NATASHA Brown MD  [address],   [place] [address]       NOLANETS: [facility]  A collaboration between [facility] and [facility] [address]  [address]  [place] [address]  Phone: [phone]  Fax: [phone]        BIRD Gifford M.D., FACS  Antoni Simpson M.D.  Hiram Andres M.D.   Kerwin Orlando M.D., FACS  DO Jason Grimaldo M.D., FACS   Patient: Aga Askew   MR Number: [id number]   YOB: 1940   Date of Visit: 10/25/2017     Dear Dr. Brown    Thank you for the kind referral of Aga Askew. We saw this patient on 10/25/2017, and a copy of our clinic note is enclosed. We certainly appreciate the opportunity to participate in your patient's care.     If you have any questions or wish to discuss your patient further, please do not hesitate to contact us at [phone].       Kindest personal regards,          Jason Pedro MD, FACS  [facility]  [facility], Dept. Of Surgery  [address], [address]  [place] [address] [phone]

## 2017-10-25 NOTE — TELEPHONE ENCOUNTER
----- Message from Mansi Ma RN sent at 10/25/2017 12:59 PM CDT -----  Regarding: upper eus in april 2018  Jett Romero,  Can you please reach out to this patient and get her set up fro another EUS on April 24, 2018.  She has a CT on April 23 and an appt with Dr Pedro on April 25, 2018.    Thanks,  Yue

## 2017-10-25 NOTE — PATIENT INSTRUCTIONS
Labs every 3 months-orders given to patient    CT prior to returning to clinic-call [phone] to schedule    Return to clinic in 6 months-appointment scheduled    Someone will contact you to set up the EUS when you are here to see Dr Pedro in April 2018

## 2017-10-25 NOTE — PROGRESS NOTES
"NOLANETS:  [place] [facility] [facility]  A collaboration between [facility] and [facility]    PATIENT: Aga Askew  MRN: [id number]  DATE: 10/25/2017    Vitals:    10/25/17 0927   BP: (!) 179/82   Pulse: 68   Temp: 97.1 °F (36.2 °C)   TempSrc: Oral   Weight: 97.1 kg (214 lb 1.1 oz)   Height: 5' 4" (1.626 m)      Last 2 Weight Measurements:   Wt Readings from Last 2 Encounters:   10/25/17 97.1 kg (214 lb 1.1 oz)   10/24/17 96.6 kg (213 lb)     BMI: Body mass index is 36.74 kg/m².    Karnofsky Score    Karnofsky Score:  80% - Normal Activity with Effort: Some Symptoms of Disease       Diagnosis:   1. Carcinoid tumor of stomach      Interval History: Ms. 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    Past Medical History:   Diagnosis Date    HTN (hypertension)     Hypothyroidism     Malignant carcinoid tumor of stomach        Past Surgical History:   Procedure Laterality Date    APPENDECTOMY      CHOLECYSTECTOMY      COLON SURGERY      fistula repair    COLONOSCOPY      ESOPHAGEAL DILATION      2-3 times    FOOT SURGERY Right     fused    HIATAL HERNIA REPAIR      HYSTERECTOMY  02/2008       Review of patient's allergies indicates:   Allergen Reactions    Codeine Shortness Of Breath and Other (See Comments)     Chest pain    Dexamethasone      Itching       Current Outpatient Prescriptions   Medication Sig Dispense Refill    acetaminophen (TYLENOL) 500 MG tablet Take 500 mg by mouth every 6 (six) hours as needed for Pain.      betamethasone dipropionate (DIPROLENE) 0.05 % cream Apply 1 each topically As instructed.      carvedilol (COREG) 3.125 MG tablet Take 3.125 mg by mouth once daily.      furosemide (LASIX) 20 MG tablet Take 20 mg by mouth As instructed.      levothyroxine (SYNTHROID) 50 MCG tablet Take 50 mcg by mouth once daily.      magnesium oxide (MAG-OX) 400 mg tablet " TAKE 1 TABLET BY MOUTH EVERY DAY      meloxicam (MOBIC) 15 MG tablet Take 15 mg by mouth once daily.      pantoprazole (PROTONIX) 40 MG tablet Take 40 mg by mouth As instructed.      potassium chloride SA (K-DUR,KLOR-CON) 20 MEQ tablet Take 20 mEq by mouth As instructed.      telmisartan (MICARDIS) 80 MG Tab Take 80 mg by mouth once daily.       No current facility-administered medications for this visit.        Review of Systems     Number of Days per Week Number per Day   DIARRHEA occasioanl    BRISTOL STOOL SCALE RATING     FLUSHING occasional    WHEEZING 0      WEIGHT GAIN/LOSS Stable--213 today   ENERGY RATING (0-10) 10      Physical Exam deferred.    Findings:       Endoscopic Finding :       The examined esophagus was normal.       Multiple small sessile polyps with no bleeding and no stigmata of        recent bleeding were found in the gastric body. Biopsies were taken        with a cold forceps for histology.       Diffuse mildly erythematous mucosa without bleeding was found in the        entire examined stomach. Biopsies were taken with a cold forceps for        histology. pH 6.       The examined duodenum was normal.       Endosonographic Finding :       The esophagus, stomach and duodenum and adjacent structures were        visualized endosonographically.       The Aorta was identified and is followed to the Celiac and        Mesenteric Artery takeoff. They appeared normal without evidence of        lymphadenopathy in the region. The Celiac Artery is then followed        until the pancreas is identified. The pancreatic body and tail are        surveyed from this region.       The pancreatic parenchyma has the normal salt and pepper appearance.        The pancreatic duct measured 1.4 mm in diameter. The pancreatic duct        was normal in its course without areas of dilation, stricture or        irregularity. No abnormal side branches were identified.       Few mucosal lesions were found in the body  of the stomach. The        lesion were homogeneous. Sonographically, the lesions appeared to        originate from the luminal interface/superficial mucosa (Layer 1).        The lesions also measured 5 mm by 4 mm in diameter. The outer        endosonographic borders were well defined.       There was no sign of significant endosonographic abnormality in the        visualized portion of the liver. Homogeneous parenchyma was        identified.       No lymphadenopathy seen.       There was oozing of blood from the biopsy siet in the antrum.        Coagulation for hemostasis using argon plasma at 1 liter/minute and        25 henry was successful. For hemostasis, two hemostatic clips were        successfully placed. There was no bleeding at the end of the        procedure.  Impression:           - Normal esophagus.                        - Multiple gastric polyps. Biopsied.                        - Erythematous mucosa in the stomach. Biopsied.                        - Normal examined duodenum.                        - An intramural (subepithelial) lesion was found in                         the body of the stomach. The lesion appeared to                         originate from within the luminal                         interface/superficial mucosa (Layer 1).                        - There was no evidence of significant pathology in                         the visualized portion of the liver.  Pathology Data:  pending    Laboratory Data:  Neuroendocrine Labs Latest Ref Rng & Units 10/4/2017 9/6/2017   EXT 5 HIAA BLOOD 0 - 22 ng/ml 8.9    EXT GASTRIN 0 - 115 pg/ml 705 (A)    EXT SEROTONIN 0 - 420 ng/ml 33 84   EXT CHROMOGRANIN A 0 - 5 nmol/l 18 (A) 1,193 (A)   EXT PANCREASTATIN CHINYERE 10 - 135 pg/ml 113    EXT NEUROKININ A CHINYERE 0 - 40.0 pg/ml 12.0    EXT ANTI PARIETAL CELL AB 0.0 - 20.0 40.3 (A)    EXT ANTI THYROID AB 0 - 34 iu/ml >600    EXT FOLATE >3.0 ng/ml 17.1    EXT VITAMIN B12 211 - 946 pg/ml 188 (A)    EXT TSH 0.270 -  4.200 uiu/ml     EXT WBC 3.4 - 10.8 x10e3/ul 3.9    EXT HGB 11.1 - 15.9 g/dl 11.2    EXT HCT 34.0 - 46.6 % 34.6    EXT PLATLETS 150 - 379 x10e3/ul 232    EXT GLUCOSE 65 - 99 mg/dl 134 (A)    EXT BUN 8 - 27 mg/dl 16    EXT CREATININE 0.57 - 1.00 mg/dl 1.04 (A)    EXT  - 144 mmol/l 140    EXT K 3.5 - 5.2 mmol/l 4.3    EXT CHLORIDE 96 - 106 mmol/l 103    EXT CO2 18 - 29 mmol/l 21    EXT CALCIUM 8.7 - 10.3 mg/dl 9.1    EXT PROTEIN, TOTAL 6.0 - 8.5 g/dl 6.3    EXT ALBUMIN 3.5 - 4.8 g/dl 3.7    EXT TOTAL BILIRUBIN 0.0 - 1.2 mg/dl 0.6    EXT ALK PHOSPHATASE 39 - 117 iu/l 112    EXT SGOT (AST) 0 - 40 iu/l 17    EXT ALT 0 - 32 iu/l 12    EXT TRIGLYCERIDES 0 - 150 mg/dl     EXT CHOLESETEROL 3 - 200 mg/dl     EXT HDL >40 mg/dl     EXT LDL 0 - 100 mg/dl     Weight        Neuroendocrine Labs Latest Ref Rng & Units 9/5/2017   EXT 5 HIAA BLOOD 0 - 22 ng/ml    EXT GASTRIN 0 - 115 pg/ml    EXT SEROTONIN 0 - 420 ng/ml    EXT CHROMOGRANIN A 0 - 5 nmol/l    EXT PANCREASTATIN CHINYERE 10 - 135 pg/ml    EXT NEUROKININ A CHINYERE 0 - 40.0 pg/ml    EXT ANTI PARIETAL CELL AB 0.0 - 20.0    EXT ANTI THYROID AB 0 - 34 iu/ml    EXT FOLATE >3.0 ng/ml    EXT VITAMIN B12 211 - 946 pg/ml    EXT TSH 0.270 - 4.200 uiu/ml 1,270 (A)   EXT WBC 3.4 - 10.8 x10e3/ul 5.7   EXT HGB 11.1 - 15.9 g/dl 12.1   EXT HCT 34.0 - 46.6 % 38   EXT PLATLETS 150 - 379 x10e3/ul 263   EXT GLUCOSE 65 - 99 mg/dl 109 (A)   EXT BUN 8 - 27 mg/dl 18.3   EXT CREATININE 0.57 - 1.00 mg/dl 1.00   EXT  - 144 mmol/l 144   EXT K 3.5 - 5.2 mmol/l 5.5   EXT CHLORIDE 96 - 106 mmol/l 107   EXT CO2 18 - 29 mmol/l 28   EXT CALCIUM 8.7 - 10.3 mg/dl 9.8   EXT PROTEIN, TOTAL 6.0 - 8.5 g/dl 6.6   EXT ALBUMIN 3.5 - 4.8 g/dl 4.2   EXT TOTAL BILIRUBIN 0.0 - 1.2 mg/dl 0.4   EXT ALK PHOSPHATASE 39 - 117 iu/l 115 (A)   EXT SGOT (AST) 0 - 40 iu/l 18   EXT ALT 0 - 32 iu/l 13   EXT TRIGLYCERIDES 0 - 150 mg/dl 104   EXT CHOLESETEROL 3 - 200 mg/dl 171   EXT HDL >40 mg/dl 63   EXT LDL 0 - 100 mg/dl 87    Weight         Radiology Data:  Findings:  Visualized lower chest is unremarkable.    The segment 4A left hepatic lobe demonstrates a tiny (0.5 cm) low density lesion, too small to characterize. No suspicious liver lesion.    Cholecystectomy. The spleen, pancreas, and adrenals are unremarkable. Main portal vein is patent.    The residual colon demonstrates scattered diverticula. No bowel wall thickening or dilation. Unremarkable distal colonic anastomosis. Stomach is unremarkable. Unremarkable post surgical changes around the proximal stomach. No ascites.    No adenopathy.    Focal right renal scarring. Kidneys are otherwise unremarkable. Ureters are unremarkable. Hysterectomy. Urinary bladder is unremarkable.    Body wall soft tissues are unremarkable.    No suspicious bone lesion.   Impression       1. No evidence of abdominal or pelvic metastasis.    2. No acute abnormality.                       Impression:  1.  gastric type 1 NET-- pathology from EUS this week is pending       Plan:restage in 6 month with eus and ct scan--alternate eus and egd every 6 months                       start b12 1000 micrograms every month IM       Labs: Markers: 3 month intervals            Other: 6 month intervals--alternate eus ( NEXT VISIT) WITH EGD EVERY 6 MONTHS    Scans: 6 month intervals--CT    Return to Clinic:6 month intervals    Orders placed this visit: No orders of the defined types were placed in this encounter.       60 new  Minutes Face-to-Face; Counseling/Coordination of Care > 50 percent of Visit     Jason Pedro MD, FACS  [facility] [place] [facility]  [address] [place] [address]., [address]  [place]  [address]  Cell [phone]  ph. [phone]; [phone]  fax. [phone]  [email or web address]

## 2017-11-02 ENCOUNTER — TELEPHONE (OUTPATIENT)
Dept: NEUROLOGY | Facility: HOSPITAL | Age: 77
End: 2017-11-02

## 2017-11-02 NOTE — TELEPHONE ENCOUNTER
----- Message from Jackelin Hay sent at 11/2/2017  4:24 PM CDT -----  Contact: [phone] self  Patient would like to get test results. Please advise.

## 2018-02-20 ENCOUNTER — TELEPHONE (OUTPATIENT)
Dept: GASTROENTEROLOGY | Facility: CLINIC | Age: 78
End: 2018-02-20

## 2018-02-20 DIAGNOSIS — D3A.092 CARCINOID TUMOR OF STOMACH: Primary | ICD-10-CM

## 2018-02-22 ENCOUNTER — TELEPHONE (OUTPATIENT)
Dept: NEUROLOGY | Facility: HOSPITAL | Age: 78
End: 2018-02-22

## 2018-02-22 NOTE — TELEPHONE ENCOUNTER
----- Message from Lakshmi Vann LPN sent at 2/21/2018  4:17 PM CST -----  Patient scheduled for EUS on 04/24 per recall list.  Patient would like to discuss a different date for Dr. Pedro's appointment.  Please call patient tomorrow after 300 to discuss.  Please let me know if the procedure needs to be rescheduled.  Thanks

## 2018-04-11 ENCOUNTER — TELEPHONE (OUTPATIENT)
Dept: NEUROLOGY | Facility: HOSPITAL | Age: 78
End: 2018-04-11

## 2018-04-11 ENCOUNTER — TELEPHONE (OUTPATIENT)
Dept: GASTROENTEROLOGY | Facility: CLINIC | Age: 78
End: 2018-04-11

## 2018-04-11 NOTE — TELEPHONE ENCOUNTER
Made sure that all appts with Dr. Pedro for April have been canceled and routed a message to Lakshmi Caba to have the procedure scheduled on 4/24/18 with Dr. Collins to be canceled.

## 2018-04-11 NOTE — TELEPHONE ENCOUNTER
----- Message from Rebecca Romero sent at 4/11/2018 11:26 AM CDT -----  Contact: Patient  EAW/NEW- Patient called to cancel all tests and appt with  Dr Pedro. Patients procedure needs to be cancelled. She will call back to reschedule after she gets everything straight with insurance

## 2018-04-11 NOTE — TELEPHONE ENCOUNTER
----- Message from Martha Torres sent at 4/11/2018 11:46 AM CDT -----  Contact: self  Patient states need to cancel procedure scheduled for 4/24/2018.   Pt will call to reschedule.

## 2018-04-26 ENCOUNTER — TELEPHONE (OUTPATIENT)
Dept: GASTROENTEROLOGY | Facility: CLINIC | Age: 78
End: 2018-04-26

## 2018-04-26 DIAGNOSIS — D3A.00 CARCINOID TUMOR: Primary | ICD-10-CM

## 2021-05-18 ENCOUNTER — APPOINTMENT (RX ONLY)
Dept: URBAN - METROPOLITAN AREA CLINIC 158 | Facility: CLINIC | Age: 81
Setting detail: DERMATOLOGY
End: 2021-05-18

## 2021-05-18 VITALS — TEMPERATURE: 97.4 F | HEIGHT: 65 IN | WEIGHT: 223 LBS

## 2021-05-18 DIAGNOSIS — L29.89 OTHER PRURITUS: ICD-10-CM

## 2021-05-18 DIAGNOSIS — R21 RASH AND OTHER NONSPECIFIC SKIN ERUPTION: ICD-10-CM | Status: INADEQUATELY CONTROLLED

## 2021-05-18 DIAGNOSIS — L30.9 DERMATITIS, UNSPECIFIED: ICD-10-CM

## 2021-05-18 PROBLEM — L29.8 OTHER PRURITUS: Status: ACTIVE | Noted: 2021-05-18

## 2021-05-18 PROCEDURE — ? TREATMENT REGIMEN

## 2021-05-18 PROCEDURE — ? BIOPSY BY SHAVE METHOD

## 2021-05-18 PROCEDURE — ? PRESCRIPTION

## 2021-05-18 PROCEDURE — 99204 OFFICE O/P NEW MOD 45 MIN: CPT | Mod: 25

## 2021-05-18 PROCEDURE — 36415 COLL VENOUS BLD VENIPUNCTURE: CPT

## 2021-05-18 PROCEDURE — ? COUNSELING

## 2021-05-18 PROCEDURE — ? VENIPUNCTURE

## 2021-05-18 PROCEDURE — ? ORDER TESTS

## 2021-05-18 PROCEDURE — 11102 TANGNTL BX SKIN SINGLE LES: CPT

## 2021-05-18 RX ORDER — TRIAMCINOLONE ACETONIDE 1 MG/G
APPLY CREAM TOPICAL BID
Qty: 45 | Refills: 3 | Status: ERX

## 2021-05-18 RX ORDER — FEXOFENADINE 180 MG/1
180MG TABLET, FILM COATED ORAL BID
Qty: 60 | Refills: 5 | Status: ERX

## 2021-05-18 RX ORDER — CETIRIZINE HCL 10 MG
10MG CAPSULE ORAL BID
Qty: 60 | Refills: 3 | Status: ERX | COMMUNITY
Start: 2021-05-18

## 2021-05-18 RX ADMIN — Medication 1: at 00:00

## 2021-05-18 ASSESSMENT — LOCATION ZONE DERM
LOCATION ZONE: TRUNK
LOCATION ZONE: LEG
LOCATION ZONE: ARM

## 2021-05-18 ASSESSMENT — LOCATION DETAILED DESCRIPTION DERM
LOCATION DETAILED: RIGHT ANTERIOR PROXIMAL THIGH
LOCATION DETAILED: PERIUMBILICAL SKIN
LOCATION DETAILED: LEFT DISTAL MEDIAL POSTERIOR UPPER ARM
LOCATION DETAILED: LEFT ANTERIOR PROXIMAL THIGH
LOCATION DETAILED: LEFT ANTECUBITAL SKIN
LOCATION DETAILED: LEFT RIB CAGE
LOCATION DETAILED: LEFT ANTERIOR DISTAL THIGH
LOCATION DETAILED: RIGHT ANTERIOR DISTAL THIGH

## 2021-05-18 ASSESSMENT — LOCATION SIMPLE DESCRIPTION DERM
LOCATION SIMPLE: LEFT UPPER ARM
LOCATION SIMPLE: RIGHT THIGH
LOCATION SIMPLE: LEFT POSTERIOR UPPER ARM
LOCATION SIMPLE: LEFT THIGH
LOCATION SIMPLE: ABDOMEN

## 2021-05-18 ASSESSMENT — BSA RASH: BSA RASH: 20

## 2021-05-18 NOTE — PROCEDURE: BIOPSY BY SHAVE METHOD
Bill For Surgical Tray: no
Cryotherapy Text: The wound bed was treated with cryotherapy after the biopsy was performed.
Information: Selecting Yes will display possible errors in your note based on the variables you have selected. This validation is only offered as a suggestion for you. PLEASE NOTE THAT THE VALIDATION TEXT WILL BE REMOVED WHEN YOU FINALIZE YOUR NOTE. IF YOU WANT TO FAX A PRELIMINARY NOTE YOU WILL NEED TO TOGGLE THIS TO 'NO' IF YOU DO NOT WANT IT IN YOUR FAXED NOTE.
Anesthesia Volume In Cc: 0.5
Notification Instructions: Patient will be notified of biopsy results. However, patient instructed to call the office if not contacted within 2 weeks.
Depth Of Biopsy: dermis
Consent: Verbal consent was obtained and risks were reviewed including but not limited to scarring, infection, bleeding, scabbing, incomplete removal, nerve damage and allergy to anesthesia.
Dressing: bandage
Post-Care Instructions: I reviewed with the patient in detail post-care instructions. Patient is to keep the biopsy site dry overnight, and then apply bacitracin twice daily until healed. Patient may apply hydrogen peroxide soaks to remove any crusting.
Curettage Text: The wound bed was treated with curettage after the biopsy was performed.
Additional Anesthesia Volume In Cc (Will Not Render If 0): 0
Was A Bandage Applied: Yes
Hemostasis: Emir's
Biopsy Type: H and E
Silver Nitrate Text: The wound bed was treated with silver nitrate after the biopsy was performed.
Billing Type: Third-Party Bill
Detail Level: Detailed
Biopsy Method: Dermablade
Wound Care: Vaseline
Anesthesia Type: 1% lidocaine without epinephrine
Electrodesiccation And Curettage Text: The wound bed was treated with electrodesiccation and curettage after the biopsy was performed.
Type Of Destruction Used: Curettage
Electrodesiccation Text: The wound bed was treated with electrodesiccation after the biopsy was performed.

## 2021-05-18 NOTE — PROCEDURE: TREATMENT REGIMEN
Detail Level: Zone
Initiate Treatment: Fexofinadine 180mg BID\\nTriamcinolone cream
Continue Regimen: Cetirizine 10mg BID
Initiate Treatment: TAC 0.1% cream BID

## 2021-05-18 NOTE — PROCEDURE: VENIPUNCTURE
Bill 55452 For Specimen Handling/Conveyance To Laboratory?: no
Detail Level: None
Number Of Tubes Drawn: 5
Venipuncture Paragraph: An alcohol pad was applied to the venipuncture site. Venipuncture was performed using a butterfly needle. Pressure and a bandaid was applied to the site. No complications were noted.

## 2021-06-29 ENCOUNTER — APPOINTMENT (RX ONLY)
Dept: URBAN - METROPOLITAN AREA CLINIC 158 | Facility: CLINIC | Age: 81
Setting detail: DERMATOLOGY
End: 2021-06-29

## 2021-06-29 VITALS — HEIGHT: 64 IN | WEIGHT: 224 LBS

## 2021-06-29 DIAGNOSIS — L30.9 DERMATITIS, UNSPECIFIED: ICD-10-CM

## 2021-06-29 DIAGNOSIS — L29.89 OTHER PRURITUS: ICD-10-CM | Status: IMPROVED

## 2021-06-29 PROBLEM — L29.8 OTHER PRURITUS: Status: ACTIVE | Noted: 2021-06-29

## 2021-06-29 PROCEDURE — ? TREATMENT REGIMEN

## 2021-06-29 PROCEDURE — 99213 OFFICE O/P EST LOW 20 MIN: CPT

## 2021-06-29 PROCEDURE — ? COUNSELING

## 2021-06-29 ASSESSMENT — LOCATION SIMPLE DESCRIPTION DERM
LOCATION SIMPLE: RIGHT UPPER BACK
LOCATION SIMPLE: RIGHT UPPER ARM
LOCATION SIMPLE: LEFT POSTERIOR UPPER ARM
LOCATION SIMPLE: LEFT THIGH
LOCATION SIMPLE: RIGHT THIGH

## 2021-06-29 ASSESSMENT — LOCATION DETAILED DESCRIPTION DERM
LOCATION DETAILED: LEFT DISTAL MEDIAL POSTERIOR UPPER ARM
LOCATION DETAILED: RIGHT INFERIOR UPPER BACK
LOCATION DETAILED: RIGHT ANTERIOR DISTAL THIGH
LOCATION DETAILED: RIGHT ANTERIOR DISTAL UPPER ARM
LOCATION DETAILED: LEFT ANTERIOR DISTAL THIGH

## 2021-06-29 ASSESSMENT — LOCATION ZONE DERM
LOCATION ZONE: LEG
LOCATION ZONE: TRUNK
LOCATION ZONE: ARM

## 2021-06-29 NOTE — PROCEDURE: TREATMENT REGIMEN
Discontinue Regimen: Cetirizine 10mg BID (pt is not taking currently)
Detail Level: Zone
Continue Regimen: Fexofinadine 180mg BID, Take one tablet PO BID
Continue Regimen: TAC 0.1% cream BID prn
Plan: No rash today. Pt will have TAC cream available if needed
Plan: Pt has improved with Fexofenadine. She denies any itching at this time. She can wean Fexofenadine if she would like but will restart if itching returns

## 2021-10-12 ENCOUNTER — APPOINTMENT (RX ONLY)
Dept: URBAN - METROPOLITAN AREA CLINIC 158 | Facility: CLINIC | Age: 81
Setting detail: DERMATOLOGY
End: 2021-10-12

## 2021-10-12 DIAGNOSIS — L29.89 OTHER PRURITUS: ICD-10-CM | Status: UNCHANGED

## 2021-10-12 PROBLEM — L29.8 OTHER PRURITUS: Status: ACTIVE | Noted: 2021-10-12

## 2021-10-12 PROCEDURE — ? PRESCRIPTION

## 2021-10-12 PROCEDURE — ? TREATMENT REGIMEN

## 2021-10-12 PROCEDURE — 99214 OFFICE O/P EST MOD 30 MIN: CPT

## 2021-10-12 PROCEDURE — ? COUNSELING

## 2021-10-12 RX ORDER — LEVOCETIRIZINE DIHYDROCHLORIDE 5 MG
5 TABLET ORAL BID
Qty: 60 | Refills: 5 | Status: ERX

## 2021-10-12 ASSESSMENT — LOCATION DETAILED DESCRIPTION DERM
LOCATION DETAILED: PERIUMBILICAL SKIN
LOCATION DETAILED: RIGHT ANTERIOR PROXIMAL UPPER ARM
LOCATION DETAILED: RIGHT PROXIMAL PRETIBIAL REGION
LOCATION DETAILED: LEFT PROXIMAL PRETIBIAL REGION
LOCATION DETAILED: SUPERIOR LUMBAR SPINE
LOCATION DETAILED: LEFT ANTERIOR PROXIMAL UPPER ARM

## 2021-10-12 ASSESSMENT — LOCATION SIMPLE DESCRIPTION DERM
LOCATION SIMPLE: ABDOMEN
LOCATION SIMPLE: LEFT PRETIBIAL REGION
LOCATION SIMPLE: LEFT UPPER ARM
LOCATION SIMPLE: RIGHT UPPER ARM
LOCATION SIMPLE: RIGHT PRETIBIAL REGION
LOCATION SIMPLE: LOWER BACK

## 2021-10-12 ASSESSMENT — LOCATION ZONE DERM
LOCATION ZONE: ARM
LOCATION ZONE: LEG
LOCATION ZONE: TRUNK

## 2021-10-12 NOTE — PROCEDURE: TREATMENT REGIMEN
Detail Level: Zone
Initiate Treatment: Xyzal 5 mg BID
Plan: Pt does not have rash today. She is very itchy and is causing some excoriation. She is taking Fexofenadine BID. Cetirizine made her too drowsy. Will change to Xyzal BID. Discussed dapsone if pt does not improve
Continue Regimen: Fexofenadine 180 mg BID

## 2021-12-03 ENCOUNTER — APPOINTMENT (RX ONLY)
Dept: URBAN - METROPOLITAN AREA CLINIC 158 | Facility: CLINIC | Age: 81
Setting detail: DERMATOLOGY
End: 2021-12-03

## 2021-12-03 VITALS — HEIGHT: 64 IN | WEIGHT: 233 LBS

## 2021-12-03 DIAGNOSIS — L81.4 OTHER MELANIN HYPERPIGMENTATION: ICD-10-CM

## 2021-12-03 DIAGNOSIS — D22 MELANOCYTIC NEVI: ICD-10-CM | Status: INADEQUATELY CONTROLLED

## 2021-12-03 DIAGNOSIS — L82.1 OTHER SEBORRHEIC KERATOSIS: ICD-10-CM

## 2021-12-03 DIAGNOSIS — L29.89 OTHER PRURITUS: ICD-10-CM | Status: WELL CONTROLLED

## 2021-12-03 DIAGNOSIS — D49.2 NEOPLASM OF UNSPECIFIED BEHAVIOR OF BONE, SOFT TISSUE, AND SKIN: ICD-10-CM | Status: INADEQUATELY CONTROLLED

## 2021-12-03 PROBLEM — D22.5 MELANOCYTIC NEVI OF TRUNK: Status: ACTIVE | Noted: 2021-12-03

## 2021-12-03 PROBLEM — L29.8 OTHER PRURITUS: Status: ACTIVE | Noted: 2021-12-03

## 2021-12-03 PROCEDURE — 11102 TANGNTL BX SKIN SINGLE LES: CPT

## 2021-12-03 PROCEDURE — ? COUNSELING

## 2021-12-03 PROCEDURE — ? BIOPSY BY SHAVE METHOD

## 2021-12-03 PROCEDURE — ? PRESCRIPTION

## 2021-12-03 PROCEDURE — ? TREATMENT REGIMEN

## 2021-12-03 PROCEDURE — 99213 OFFICE O/P EST LOW 20 MIN: CPT | Mod: 25

## 2021-12-03 RX ORDER — FEXOFENADINE 180 MG/1
TABLET, FILM COATED ORAL BID
Qty: 60 | Refills: 11 | Status: ERX | COMMUNITY
Start: 2021-12-03

## 2021-12-03 RX ORDER — LEVOCETIRIZINE DIHYDROCHLORIDE 5 MG
5 TABLET ORAL BID
Qty: 60 | Refills: 11 | Status: ERX

## 2021-12-03 RX ADMIN — FEXOFENADINE 1: 180 TABLET, FILM COATED ORAL at 00:00

## 2021-12-03 ASSESSMENT — LOCATION SIMPLE DESCRIPTION DERM
LOCATION SIMPLE: ABDOMEN
LOCATION SIMPLE: RIGHT BREAST
LOCATION SIMPLE: LOWER BACK
LOCATION SIMPLE: RIGHT PRETIBIAL REGION
LOCATION SIMPLE: RIGHT UPPER ARM
LOCATION SIMPLE: LEFT UPPER ARM
LOCATION SIMPLE: LEFT FOREARM
LOCATION SIMPLE: RIGHT FOREARM
LOCATION SIMPLE: LEFT UPPER BACK
LOCATION SIMPLE: LEFT PRETIBIAL REGION

## 2021-12-03 ASSESSMENT — LOCATION DETAILED DESCRIPTION DERM
LOCATION DETAILED: LEFT DISTAL DORSAL FOREARM
LOCATION DETAILED: SUPERIOR LUMBAR SPINE
LOCATION DETAILED: LEFT PROXIMAL PRETIBIAL REGION
LOCATION DETAILED: RIGHT PROXIMAL PRETIBIAL REGION
LOCATION DETAILED: RIGHT PROXIMAL DORSAL FOREARM
LOCATION DETAILED: LEFT ANTERIOR PROXIMAL UPPER ARM
LOCATION DETAILED: RIGHT LATERAL BREAST 9-10:00 REGION
LOCATION DETAILED: LEFT MEDIAL DISTAL PRETIBIAL REGION
LOCATION DETAILED: RIGHT ANTERIOR PROXIMAL UPPER ARM
LOCATION DETAILED: LEFT SUPERIOR UPPER BACK
LOCATION DETAILED: PERIUMBILICAL SKIN

## 2021-12-03 ASSESSMENT — LOCATION ZONE DERM
LOCATION ZONE: TRUNK
LOCATION ZONE: LEG
LOCATION ZONE: ARM

## 2021-12-03 NOTE — PROCEDURE: BIOPSY BY SHAVE METHOD
Detail Level: Detailed
Size Of Lesion In Cm: 0
Billing Type: Third-Party Bill
Anesthesia Type: 1% lidocaine without epinephrine
Electrodesiccation And Curettage Text: The wound bed was treated with electrodesiccation and curettage after the biopsy was performed.
Biopsy Method: Dermablade
Hide Biopsy Depth?: No
Wound Care: Vaseline
Type Of Destruction Used: Curettage
Electrodesiccation Text: The wound bed was treated with electrodesiccation after the biopsy was performed.
Consent: Verbal consent was obtained and risks were reviewed including but not limited to scarring, infection, bleeding, scabbing, incomplete removal, nerve damage and allergy to anesthesia.
Anesthesia Volume In Cc: 0.5
Cryotherapy Text: The wound bed was treated with cryotherapy after the biopsy was performed.
Depth Of Biopsy: dermis
Notification Instructions: Patient will be notified of biopsy results. However, patient instructed to call the office if not contacted within 2 weeks.
Information: Selecting Yes will display possible errors in your note based on the variables you have selected. This validation is only offered as a suggestion for you. PLEASE NOTE THAT THE VALIDATION TEXT WILL BE REMOVED WHEN YOU FINALIZE YOUR NOTE. IF YOU WANT TO FAX A PRELIMINARY NOTE YOU WILL NEED TO TOGGLE THIS TO 'NO' IF YOU DO NOT WANT IT IN YOUR FAXED NOTE.
Dressing: bandage
Curettage Text: The wound bed was treated with curettage after the biopsy was performed.
Post-Care Instructions: I reviewed with the patient in detail post-care instructions. Patient is to keep the biopsy site dry overnight, and then apply bacitracin twice daily until healed. Patient may apply hydrogen peroxide soaks to remove any crusting.
Was A Bandage Applied: Yes
Biopsy Type: H and E
Hemostasis: Emir's
Silver Nitrate Text: The wound bed was treated with silver nitrate after the biopsy was performed.

## 2021-12-03 NOTE — PROCEDURE: TREATMENT REGIMEN
Continue Regimen: Fexofenadine 180 mg BID\\nXyzal 5mg bid
Detail Level: Zone
Plan: Pt reports she still has some itching but has improved significantly with antihistamines

## 2022-01-12 ENCOUNTER — APPOINTMENT (RX ONLY)
Dept: URBAN - METROPOLITAN AREA CLINIC 158 | Facility: CLINIC | Age: 82
Setting detail: DERMATOLOGY
End: 2022-01-12

## 2022-01-12 VITALS — HEIGHT: 64 IN | WEIGHT: 233 LBS

## 2022-01-12 DIAGNOSIS — I87.2 VENOUS INSUFFICIENCY (CHRONIC) (PERIPHERAL): ICD-10-CM | Status: INADEQUATELY CONTROLLED

## 2022-01-12 DIAGNOSIS — L29.89 OTHER PRURITUS: ICD-10-CM

## 2022-01-12 PROBLEM — L29.8 OTHER PRURITUS: Status: ACTIVE | Noted: 2022-01-12

## 2022-01-12 PROCEDURE — ? TREATMENT REGIMEN

## 2022-01-12 PROCEDURE — 99214 OFFICE O/P EST MOD 30 MIN: CPT

## 2022-01-12 PROCEDURE — ? COUNSELING

## 2022-01-12 PROCEDURE — ? PRESCRIPTION

## 2022-01-12 RX ORDER — BETAMETHASONE DIPROPIONATE 0.5 MG/G
APPLY OINTMENT, AUGMENTED TOPICAL BID
Qty: 50 | Refills: 5 | Status: ERX

## 2022-01-12 RX ORDER — MINOCYCLINE HYDROCHLORIDE 100 MG/1
100MG CAPSULE ORAL BID
Qty: 14 | Refills: 0 | Status: ERX | COMMUNITY
Start: 2022-01-12

## 2022-01-12 RX ADMIN — MINOCYCLINE HYDROCHLORIDE 1: 100 CAPSULE ORAL at 00:00

## 2022-01-12 ASSESSMENT — LOCATION SIMPLE DESCRIPTION DERM
LOCATION SIMPLE: RIGHT PRETIBIAL REGION
LOCATION SIMPLE: ABDOMEN
LOCATION SIMPLE: LEFT PRETIBIAL REGION
LOCATION SIMPLE: RIGHT UPPER ARM
LOCATION SIMPLE: LOWER BACK
LOCATION SIMPLE: LEFT UPPER ARM

## 2022-01-12 ASSESSMENT — LOCATION ZONE DERM
LOCATION ZONE: LEG
LOCATION ZONE: ARM
LOCATION ZONE: TRUNK

## 2022-01-12 ASSESSMENT — LOCATION DETAILED DESCRIPTION DERM
LOCATION DETAILED: LEFT ANTERIOR PROXIMAL UPPER ARM
LOCATION DETAILED: SUPERIOR LUMBAR SPINE
LOCATION DETAILED: LEFT PROXIMAL PRETIBIAL REGION
LOCATION DETAILED: RIGHT PROXIMAL PRETIBIAL REGION
LOCATION DETAILED: LEFT DISTAL PRETIBIAL REGION
LOCATION DETAILED: RIGHT ANTERIOR PROXIMAL UPPER ARM
LOCATION DETAILED: PERIUMBILICAL SKIN

## 2022-01-12 ASSESSMENT — SEVERITY ASSESSMENT: SEVERITY: MILD TO MODERATE

## 2022-01-12 NOTE — PROCEDURE: TREATMENT REGIMEN
Detail Level: Zone
Plan: Pt reports she still has some itching but has improved significantly with antihistamines. Pt is unsure which Rx she’s taking.
Continue Regimen: Fexofenadine 180 mg BID\\nXyzal 5mg bid
Initiate Treatment: Minocycline 100mg BID x 7 days\\nAugmented betamethasone 0.05% ointment BID ( Legs )

## 2022-02-15 ENCOUNTER — APPOINTMENT (RX ONLY)
Dept: URBAN - METROPOLITAN AREA CLINIC 158 | Facility: CLINIC | Age: 82
Setting detail: DERMATOLOGY
End: 2022-02-15

## 2022-02-15 VITALS — WEIGHT: 231 LBS | HEIGHT: 65 IN

## 2022-02-15 DIAGNOSIS — L29.89 OTHER PRURITUS: ICD-10-CM | Status: WELL CONTROLLED

## 2022-02-15 DIAGNOSIS — I87.2 VENOUS INSUFFICIENCY (CHRONIC) (PERIPHERAL): ICD-10-CM | Status: WELL CONTROLLED

## 2022-02-15 PROBLEM — L29.8 OTHER PRURITUS: Status: ACTIVE | Noted: 2022-02-15

## 2022-02-15 PROCEDURE — 99213 OFFICE O/P EST LOW 20 MIN: CPT

## 2022-02-15 PROCEDURE — ? COUNSELING

## 2022-02-15 PROCEDURE — ? TREATMENT REGIMEN

## 2022-02-15 PROCEDURE — ? PRESCRIPTION

## 2022-02-15 RX ORDER — FEXOFENADINE 180 MG/1
1 TABLET, FILM COATED ORAL BID
Qty: 60 | Refills: 11 | Status: ERX

## 2022-02-15 RX ORDER — BETAMETHASONE DIPROPIONATE 0.5 MG/G
OINTMENT, AUGMENTED TOPICAL BID
Qty: 150 | Refills: 11 | Status: ERX

## 2022-02-15 ASSESSMENT — LOCATION DETAILED DESCRIPTION DERM
LOCATION DETAILED: PERIUMBILICAL SKIN
LOCATION DETAILED: LEFT DISTAL PRETIBIAL REGION
LOCATION DETAILED: RIGHT ANTERIOR PROXIMAL UPPER ARM
LOCATION DETAILED: LEFT ANTERIOR PROXIMAL UPPER ARM
LOCATION DETAILED: RIGHT PROXIMAL PRETIBIAL REGION
LOCATION DETAILED: SUPERIOR LUMBAR SPINE
LOCATION DETAILED: LEFT PROXIMAL PRETIBIAL REGION

## 2022-02-15 ASSESSMENT — LOCATION SIMPLE DESCRIPTION DERM
LOCATION SIMPLE: LEFT UPPER ARM
LOCATION SIMPLE: RIGHT UPPER ARM
LOCATION SIMPLE: ABDOMEN
LOCATION SIMPLE: LOWER BACK
LOCATION SIMPLE: LEFT PRETIBIAL REGION
LOCATION SIMPLE: RIGHT PRETIBIAL REGION

## 2022-02-15 ASSESSMENT — SEVERITY ASSESSMENT: SEVERITY: CLEAR

## 2022-02-15 ASSESSMENT — LOCATION ZONE DERM
LOCATION ZONE: ARM
LOCATION ZONE: TRUNK
LOCATION ZONE: LEG

## 2022-02-15 NOTE — PROCEDURE: TREATMENT REGIMEN
Detail Level: Zone
Continue Regimen: Fexofenadine 180 mg BID
Discontinue Regimen: Xyzal( pt never started)
Continue Regimen: Augmented betamethasone 0.05% ointment BID prn (Legs)

## 2022-03-31 ENCOUNTER — APPOINTMENT (RX ONLY)
Dept: URBAN - METROPOLITAN AREA CLINIC 158 | Facility: CLINIC | Age: 82
Setting detail: DERMATOLOGY
End: 2022-03-31

## 2022-03-31 VITALS — HEIGHT: 55 IN | WEIGHT: 222 LBS

## 2022-03-31 DIAGNOSIS — I87.2 VENOUS INSUFFICIENCY (CHRONIC) (PERIPHERAL): ICD-10-CM

## 2022-03-31 DIAGNOSIS — L20.89 OTHER ATOPIC DERMATITIS: ICD-10-CM

## 2022-03-31 DIAGNOSIS — L28.1 PRURIGO NODULARIS: ICD-10-CM

## 2022-03-31 PROCEDURE — 99214 OFFICE O/P EST MOD 30 MIN: CPT

## 2022-03-31 PROCEDURE — ? COUNSELING

## 2022-03-31 PROCEDURE — ? TREATMENT REGIMEN

## 2022-03-31 PROCEDURE — ? PRESCRIPTION

## 2022-03-31 RX ORDER — LEVOCETIRIZINE DIHYDROCHLORIDE 5 MG/1
1 TABLET, FILM COATED ORAL BID
Qty: 60 | Refills: 11 | Status: ERX | COMMUNITY
Start: 2022-03-31

## 2022-03-31 RX ORDER — LEVOCETIRIZINE DIHYDROCHLORIDE 5 MG
1 TABLET ORAL DAILY
Qty: 30 | Refills: 11 | Status: CANCELLED
Stop reason: CLARIF

## 2022-03-31 RX ORDER — BETAMETHASONE DIPROPIONATE 0.5 MG/G
APPLY CREAM, AUGMENTED TOPICAL BID
Qty: 50 | Refills: 5 | Status: ERX | COMMUNITY
Start: 2022-03-31

## 2022-03-31 RX ORDER — RUXOLITINIB 15 MG/G
APPLY CREAM TOPICAL BID
Qty: 60 | Refills: 11 | Status: ERX | COMMUNITY
Start: 2022-03-31

## 2022-03-31 RX ORDER — FEXOFENADINE HYDROCHLORIDE 180 MG/1
1 TABLET ORAL BID
Qty: 60 | Refills: 11 | Status: ERX

## 2022-03-31 RX ADMIN — BETAMETHASONE DIPROPIONATE APPLY: 0.5 CREAM, AUGMENTED TOPICAL at 00:00

## 2022-03-31 RX ADMIN — LEVOCETIRIZINE DIHYDROCHLORIDE 1: 5 TABLET, FILM COATED ORAL at 00:00

## 2022-03-31 RX ADMIN — RUXOLITINIB APPLY: 15 CREAM TOPICAL at 00:00

## 2022-03-31 ASSESSMENT — LOCATION ZONE DERM: LOCATION ZONE: LEG

## 2022-03-31 ASSESSMENT — LOCATION SIMPLE DESCRIPTION DERM
LOCATION SIMPLE: RIGHT THIGH
LOCATION SIMPLE: RIGHT PRETIBIAL REGION
LOCATION SIMPLE: LEFT THIGH
LOCATION SIMPLE: LEFT PRETIBIAL REGION

## 2022-03-31 ASSESSMENT — LOCATION DETAILED DESCRIPTION DERM
LOCATION DETAILED: RIGHT ANTERIOR PROXIMAL THIGH
LOCATION DETAILED: RIGHT ANTERIOR DISTAL THIGH
LOCATION DETAILED: LEFT DISTAL PRETIBIAL REGION
LOCATION DETAILED: LEFT ANTERIOR DISTAL THIGH
LOCATION DETAILED: RIGHT DISTAL PRETIBIAL REGION
LOCATION DETAILED: RIGHT PROXIMAL PRETIBIAL REGION
LOCATION DETAILED: LEFT PROXIMAL PRETIBIAL REGION

## 2022-03-31 NOTE — PROCEDURE: TREATMENT REGIMEN
Plan: PT is extremely itchy. Nights are the worst time for her. She does not have a significant amount of rash today. Changing antihistamines today. Discussed Doxepin for night time; however, pt has an interaction with her Fluoxetine. Also briefly discussed a trial of Dupixent if we cannot get her itching under control
Initiate Treatment: Opzelura 1.5 % topical cream BID
Detail Level: Zone
Continue Regimen: Fexofenadine 180mg BID
Plan: Pt's biopsy came back showing stasis changes. She does have edema today but unsure if that is causing the actual rash pt has today
Initiate Treatment: Xyzal 5 mg tablet BID
Continue Regimen: Augmented Betamethasone BID
Discontinue Regimen: Cetirizine 10mg (d/t sedation)
Plan: Unsure if pt has been using Aug Beta. She has TAC cream with her today. Adding Opzelura as well. Pt is extremely bothered by the small amount of rash she has today.\\nPt will need to sign [employer] paperwork. Left message for her to call back

## 2023-04-04 RX ORDER — FEXOFENADINE HYDROCHLORIDE 180 MG/1
1 TABLET ORAL BID
Qty: 60 | Refills: 2 | Status: ERX

## 2023-04-04 RX ORDER — LEVOCETIRIZINE DIHYDROCHLORIDE 5 MG/1
1 TABLET, FILM COATED ORAL BID
Qty: 60 | Refills: 2 | Status: ERX

## 2023-10-05 ENCOUNTER — APPOINTMENT (RX ONLY)
Dept: URBAN - METROPOLITAN AREA CLINIC 158 | Facility: CLINIC | Age: 83
Setting detail: DERMATOLOGY
End: 2023-10-05

## 2023-10-05 VITALS — HEIGHT: 64 IN | WEIGHT: 228 LBS

## 2023-10-05 DIAGNOSIS — L20.89 OTHER ATOPIC DERMATITIS: ICD-10-CM | Status: WELL CONTROLLED

## 2023-10-05 DIAGNOSIS — L28.1 PRURIGO NODULARIS: ICD-10-CM | Status: WELL CONTROLLED

## 2023-10-05 PROCEDURE — ? TREATMENT REGIMEN

## 2023-10-05 PROCEDURE — ? PRESCRIPTION

## 2023-10-05 PROCEDURE — ? COUNSELING

## 2023-10-05 PROCEDURE — 99213 OFFICE O/P EST LOW 20 MIN: CPT

## 2023-10-05 RX ORDER — RUXOLITINIB 15 MG/G
APPLY CREAM TOPICAL BID
Qty: 60 | Refills: 5 | Status: ERX

## 2023-10-05 RX ORDER — FEXOFENADINE HYDROCHLORIDE 180 MG/1
1 TABLET ORAL QHS
Qty: 30 | Refills: 11 | Status: ERX

## 2023-10-05 ASSESSMENT — LOCATION DETAILED DESCRIPTION DERM
LOCATION DETAILED: RIGHT ELBOW
LOCATION DETAILED: RIGHT PROXIMAL PRETIBIAL REGION
LOCATION DETAILED: LEFT DISTAL PRETIBIAL REGION
LOCATION DETAILED: RIGHT PROXIMAL DORSAL FOREARM
LOCATION DETAILED: LEFT PROXIMAL DORSAL FOREARM

## 2023-10-05 ASSESSMENT — LOCATION ZONE DERM
LOCATION ZONE: LEG
LOCATION ZONE: ARM

## 2023-10-05 ASSESSMENT — LOCATION SIMPLE DESCRIPTION DERM
LOCATION SIMPLE: LEFT PRETIBIAL REGION
LOCATION SIMPLE: RIGHT PRETIBIAL REGION
LOCATION SIMPLE: RIGHT ELBOW
LOCATION SIMPLE: RIGHT FOREARM
LOCATION SIMPLE: LEFT FOREARM

## 2023-10-05 ASSESSMENT — SEVERITY ASSESSMENT 2020: SEVERITY 2020: CLEAR

## 2023-10-05 ASSESSMENT — ITCH INTENSITY: HOW SEVERE IS YOUR ITCHING?: 0

## 2023-10-05 NOTE — PROCEDURE: TREATMENT REGIMEN
Detail Level: Zone
Continue Regimen: Fexofenadine 180mg tablet QHS
Plan: Pt prefers opzelura. She only used 1 tube over the last year. Sending to [facility], but original tube looks like it came from [facility]. If she has issues getting a refill, she will call
Discontinue Regimen: Augmented Betamethasone BID.
Continue Regimen: Opzelura 1.5 % topical cream BID

## 2024-02-02 NOTE — PROCEDURE: MIPS QUALITY
Detail Level: Detailed
No
Quality 110: Preventive Care And Screening: Influenza Immunization: Influenza Immunization previously received during influenza season
Quality 111:Pneumonia Vaccination Status For Older Adults: Pneumococcal Vaccination not Administered or Previously Received, Reason not Otherwise Specified

## 2024-04-26 NOTE — TELEPHONE ENCOUNTER
----- Message from Jackelin Hay sent at 11/2/2017  4:24 PM CDT -----  Contact: [phone] self  Patient would like to get test results. Please advise.      
Returned call to patient and informed patient that the biopsy of her stomach did confirm that she has carcinoid and we will continue to watch her closely with future EUS and EGD and labs for intense surveillance.  
What Is The Reason For Today's Visit?: Preventative Skin Check

## 2024-10-08 RX ORDER — FEXOFENADINE HYDROCHLORIDE 180 MG/1
1 TABLET ORAL QHS
Qty: 30 | Refills: 11 | Status: ERX

## 2024-10-15 ENCOUNTER — APPOINTMENT (RX ONLY)
Dept: URBAN - METROPOLITAN AREA CLINIC 183 | Facility: CLINIC | Age: 84
Setting detail: DERMATOLOGY
End: 2024-10-15

## 2024-10-15 VITALS — HEIGHT: 64 IN | WEIGHT: 208 LBS

## 2024-10-15 DIAGNOSIS — L20.89 OTHER ATOPIC DERMATITIS: ICD-10-CM

## 2024-10-15 DIAGNOSIS — L28.1 PRURIGO NODULARIS: ICD-10-CM | Status: WELL CONTROLLED

## 2024-10-15 PROCEDURE — ? PRESCRIPTION

## 2024-10-15 PROCEDURE — ? COUNSELING

## 2024-10-15 PROCEDURE — ? TREATMENT REGIMEN

## 2024-10-15 PROCEDURE — 99214 OFFICE O/P EST MOD 30 MIN: CPT

## 2024-10-15 RX ORDER — FEXOFENADINE HYDROCHLORIDE 180 MG/1
1 TABLET ORAL QHS
Qty: 30 | Refills: 11 | Status: ERX | COMMUNITY
Start: 2024-10-15

## 2024-10-15 RX ORDER — RUXOLITINIB 15 MG/G
APPLY CREAM TOPICAL BID
Qty: 60 | Refills: 11 | Status: ERX | COMMUNITY
Start: 2024-10-15

## 2024-10-15 RX ADMIN — FEXOFENADINE HYDROCHLORIDE 1: 180 TABLET ORAL at 00:00

## 2024-10-15 RX ADMIN — RUXOLITINIB APPLY: 15 CREAM TOPICAL at 00:00

## 2024-10-15 ASSESSMENT — LOCATION DETAILED DESCRIPTION DERM
LOCATION DETAILED: LEFT DISTAL PRETIBIAL REGION
LOCATION DETAILED: RIGHT ELBOW
LOCATION DETAILED: RIGHT PROXIMAL DORSAL FOREARM
LOCATION DETAILED: LEFT PROXIMAL DORSAL FOREARM
LOCATION DETAILED: RIGHT PROXIMAL PRETIBIAL REGION

## 2024-10-15 ASSESSMENT — ITCH NUMERIC RATING SCALE: HOW SEVERE IS YOUR ITCHING?: 0

## 2024-10-15 ASSESSMENT — LOCATION ZONE DERM
LOCATION ZONE: ARM
LOCATION ZONE: LEG

## 2024-10-15 ASSESSMENT — SEVERITY ASSESSMENT 2020: SEVERITY 2020: CLEAR

## 2024-10-15 ASSESSMENT — LOCATION SIMPLE DESCRIPTION DERM
LOCATION SIMPLE: RIGHT PRETIBIAL REGION
LOCATION SIMPLE: RIGHT FOREARM
LOCATION SIMPLE: LEFT FOREARM
LOCATION SIMPLE: LEFT PRETIBIAL REGION
LOCATION SIMPLE: RIGHT ELBOW

## 2024-10-15 ASSESSMENT — BSA ECZEMA: % BODY COVERED IN ECZEMA: 0

## 2024-10-15 NOTE — PROCEDURE: TREATMENT REGIMEN
Detail Level: Zone
Plan: Pt has medicare. she has an old rx for opzelura that she is still using. unsure if we can get another tube, but we will try to send to the pharmacy she got rx from previously 
Continue Regimen: Opzelura 1.5 % topical cream BID
Continue Regimen: Fexofenadine 180mg tablet QHS

## 2024-11-11 RX ORDER — RUXOLITINIB 15 MG/G
APPLY CREAM TOPICAL BID
Qty: 60 | Refills: 11 | Status: ERX

## 2024-12-03 RX ORDER — RUXOLITINIB 15 MG/G
APPLY CREAM TOPICAL BID
Qty: 60 | Refills: 11 | Status: ERX

## 2024-12-04 RX ORDER — RUXOLITINIB 15 MG/G
APPLY CREAM TOPICAL BID
Qty: 60 | Refills: 11 | Status: ERX

## 2024-12-06 RX ORDER — RUXOLITINIB 15 MG/G
APPLY CREAM TOPICAL BID
Qty: 60 | Refills: 11 | Status: ERX